# Patient Record
Sex: FEMALE | Race: WHITE | Employment: FULL TIME | URBAN - METROPOLITAN AREA
[De-identification: names, ages, dates, MRNs, and addresses within clinical notes are randomized per-mention and may not be internally consistent; named-entity substitution may affect disease eponyms.]

---

## 2017-01-23 ENCOUNTER — OFFICE VISIT (OUTPATIENT)
Dept: NEUROLOGY | Facility: CLINIC | Age: 37
End: 2017-01-23

## 2017-01-23 VITALS
WEIGHT: 155 LBS | HEIGHT: 64 IN | HEART RATE: 78 BPM | BODY MASS INDEX: 26.46 KG/M2 | SYSTOLIC BLOOD PRESSURE: 116 MMHG | RESPIRATION RATE: 16 BRPM | DIASTOLIC BLOOD PRESSURE: 70 MMHG

## 2017-01-23 DIAGNOSIS — G43.709 CHRONIC MIGRAINE WITHOUT AURA WITHOUT STATUS MIGRAINOSUS, NOT INTRACTABLE: Primary | ICD-10-CM

## 2017-01-23 PROCEDURE — 99213 OFFICE O/P EST LOW 20 MIN: CPT | Performed by: OTHER

## 2017-01-23 RX ORDER — SUMATRIPTAN 50 MG/1
50 TABLET, FILM COATED ORAL EVERY 2 HOUR PRN
Qty: 10 TABLET | Refills: 0 | Status: SHIPPED | OUTPATIENT
Start: 2017-01-23 | End: 2019-03-12

## 2017-01-23 RX ORDER — TOPIRAMATE 25 MG/1
25 TABLET ORAL 2 TIMES DAILY
Qty: 60 TABLET | Refills: 4 | Status: SHIPPED | OUTPATIENT
Start: 2017-01-23 | End: 2018-01-26 | Stop reason: ALTCHOICE

## 2017-01-23 NOTE — PROGRESS NOTES
HPI:    Patient ID: Bill Ingram is a 31year old female. HPI     Enedelia Nunez is here for follow up for migraine headaches. She was started on Topamax on last visit. She states headaches have improved since we added Topamax.  She is getting less frequent headache Prescriptions:  topiramate 25 MG Oral Tab Take 1 tablet (25 mg total) by mouth 2 (two) times daily.  Disp: 60 tablet Rfl: 4   SUMAtriptan Succinate 50 MG Oral Tab Take 1 tablet (50 mg total) by mouth every 2 (two) hours as needed for Migraine (Not to exceed and orientated x 3. Speech fluent. Pupils equally round and reactive to light. 3+ brisk bilaterally. EOMs intact. Visual fields are full. Face is symmetrical. Tongue is midline. Uvula and palate elevate symmetrically. Shrug shoulders normally bilaterally.

## 2017-01-23 NOTE — PATIENT INSTRUCTIONS
Refill policies:    • Allow 2 business days for refills; controlled substances may take longer.   • Contact your pharmacy at least 5 days prior to running out of medication and have them send an electronic request or submit request through the “request re your physician has recommended that you have a procedure or additional testing performed. [facility] will contact your insurance carrier to obtain pre-certification or prior authorization.     Unfortunately, LISSA has seen an increas

## 2017-02-01 ENCOUNTER — TELEPHONE (OUTPATIENT)
Dept: GASTROENTEROLOGY | Facility: CLINIC | Age: 37
End: 2017-02-01

## 2017-02-01 NOTE — TELEPHONE ENCOUNTER
Pt requesting to fax order lactulose hydrogen breath test to [facility] for scheduling. Pt states order was given in reg- script pad  But [facility] is requesting to fax order directly to them, [address]: [phone], Pt states she does not have the fax number.

## 2017-02-10 ENCOUNTER — HOSPITAL (OUTPATIENT)
Dept: OTHER | Age: 37
End: 2017-02-10
Attending: EMERGENCY MEDICINE

## 2017-04-20 ENCOUNTER — OFFICE VISIT (OUTPATIENT)
Dept: NEUROLOGY | Facility: CLINIC | Age: 37
End: 2017-04-20

## 2017-04-20 VITALS
DIASTOLIC BLOOD PRESSURE: 80 MMHG | SYSTOLIC BLOOD PRESSURE: 104 MMHG | TEMPERATURE: 100 F | RESPIRATION RATE: 16 BRPM | BODY MASS INDEX: 28 KG/M2 | WEIGHT: 166 LBS | OXYGEN SATURATION: 99 % | HEART RATE: 84 BPM

## 2017-04-20 DIAGNOSIS — M43.6 NECK STIFFNESS: ICD-10-CM

## 2017-04-20 DIAGNOSIS — G43.709 CHRONIC MIGRAINE WITHOUT AURA WITHOUT STATUS MIGRAINOSUS, NOT INTRACTABLE: Primary | ICD-10-CM

## 2017-04-20 PROCEDURE — 99213 OFFICE O/P EST LOW 20 MIN: CPT | Performed by: OTHER

## 2017-04-20 NOTE — PROGRESS NOTES
HPI:    Patient ID: Abdullahi Estes is a 31year old female. HPI       Edna Redd presents for follow up for migraine headaches. She states migraine headaches are stable however she has been having neck muscle tightness and at base of skull.  She has mild spine m daily. Disp: 60 tablet Rfl: 4   SUMAtriptan Succinate 50 MG Oral Tab Take 1 tablet (50 mg total) by mouth every 2 (two) hours as needed for Migraine (Not to exceed 2 pills daily).  Disp: 10 tablet Rfl: 0   TAZORAC 0.05 % External Gel GALEN AA QHS Disp:  Rfl: midline. Uvula and palate elevate symmetrically. Shrug shoulders normally bilaterally. The rest of the cranial nerves are grossly intact. Sensation to light touch is intact bilaterally. Motor: Normal tone bilaterally. No arm or leg weakness noted.  5/5 bila

## 2017-10-19 ENCOUNTER — NURSE TRIAGE (OUTPATIENT)
Dept: OTHER | Age: 37
End: 2017-10-19

## 2017-10-19 ENCOUNTER — APPOINTMENT (OUTPATIENT)
Dept: CT IMAGING | Facility: HOSPITAL | Age: 37
End: 2017-10-19
Attending: NURSE PRACTITIONER
Payer: COMMERCIAL

## 2017-10-19 ENCOUNTER — HOSPITAL ENCOUNTER (EMERGENCY)
Facility: HOSPITAL | Age: 37
Discharge: HOME | End: 2017-10-19
Payer: COMMERCIAL

## 2017-10-19 VITALS
BODY MASS INDEX: 29.27 KG/M2 | WEIGHT: 155 LBS | TEMPERATURE: 99 F | RESPIRATION RATE: 16 BRPM | HEIGHT: 61 IN | DIASTOLIC BLOOD PRESSURE: 82 MMHG | HEART RATE: 90 BPM | OXYGEN SATURATION: 100 % | SYSTOLIC BLOOD PRESSURE: 125 MMHG

## 2017-10-19 DIAGNOSIS — R10.31 ABDOMINAL PAIN, RLQ: Primary | ICD-10-CM

## 2017-10-19 DIAGNOSIS — N83.201 CYST OF RIGHT OVARY: ICD-10-CM

## 2017-10-19 PROCEDURE — 96374 THER/PROPH/DIAG INJ IV PUSH: CPT

## 2017-10-19 PROCEDURE — 85025 COMPLETE CBC W/AUTO DIFF WBC: CPT | Performed by: NURSE PRACTITIONER

## 2017-10-19 PROCEDURE — 81025 URINE PREGNANCY TEST: CPT

## 2017-10-19 PROCEDURE — 80048 BASIC METABOLIC PNL TOTAL CA: CPT | Performed by: NURSE PRACTITIONER

## 2017-10-19 PROCEDURE — 96361 HYDRATE IV INFUSION ADD-ON: CPT

## 2017-10-19 PROCEDURE — 80076 HEPATIC FUNCTION PANEL: CPT | Performed by: NURSE PRACTITIONER

## 2017-10-19 PROCEDURE — 81003 URINALYSIS AUTO W/O SCOPE: CPT | Performed by: NURSE PRACTITIONER

## 2017-10-19 PROCEDURE — 74177 CT ABD & PELVIS W/CONTRAST: CPT | Performed by: NURSE PRACTITIONER

## 2017-10-19 PROCEDURE — 99284 EMERGENCY DEPT VISIT MOD MDM: CPT

## 2017-10-19 RX ORDER — KETOROLAC TROMETHAMINE 30 MG/ML
30 INJECTION, SOLUTION INTRAMUSCULAR; INTRAVENOUS ONCE
Status: COMPLETED | OUTPATIENT
Start: 2017-10-19 | End: 2017-10-19

## 2017-10-19 RX ORDER — SODIUM CHLORIDE 9 MG/ML
125 INJECTION, SOLUTION INTRAVENOUS CONTINUOUS
Status: DISCONTINUED | OUTPATIENT
Start: 2017-10-19 | End: 2017-10-19

## 2017-10-19 NOTE — ED PROVIDER NOTES
Patient Seen in: [facility] AND [facility] Emergency Department    History   Patient presents with:  Abdomen/Flank Pain (GI/)    Stated Complaint: RLQ ABD Pain     Patient presents into the emergency room for evaluation of right lower quadrant abdominal pain. Hypertension Mother    • Depression Mother    • Other Zollie Ports Mother      COPD   • Lipids Maternal Grandmother      Hyperlipidemia   • Lipids Maternal Grandfather      Hyperlipidemia   • Lipids Paternal Grandmother      Hyperlipidemia   • Diabetes Paternal on palpation in the right lower quadrant of the abdomen in the pelvis. No rebound guarding or peritoneal signs. No tenderness at McBurney's point. No boardlike rigidity. No flank tenderness   Musculoskeletal: Normal range of motion.    Lymphadenopathy: Negative Negative   -BASIC METABOLIC PANEL (8)   Collection Time: 10/19/17 11:18 AM   Result Value Ref Range   Glucose 96 70 - 99 mg/dL   Sodium 136 136 - 144 mmol/L   Potassium 3.6 3.3 - 5.1 mmol/L   Chloride 107 95 - 110 mmol/L   CO2 22 22 - 32 mmol/L 10/19/17 11:18 AM   Result Value Ref Range   Hold Gold Auto Resulted    -RAINBOW DRAW LAVENDER TALL (BNP)   Collection Time: 10/19/17 11:18 AM   Result Value Ref Range   Hold Lavender Auto Resulted    -CBC W/ DIFFERENTIAL   Collection Time: 10/19/17 11:18

## 2017-10-19 NOTE — TELEPHONE ENCOUNTER
Action Requested: Summary for Provider     []  Critical Lab, Recommendations Needed  [] Need Additional Advice  [x]   FYI    []   Need Orders  [] Need Medications Sent to Pharmacy  []  Other     SUMMARY:  Pt to go to ER for evaluation.      Pt complaining o

## 2017-10-19 NOTE — ED INITIAL ASSESSMENT (HPI)
Sharp RLQ pain x 2 days, describes as \"tearing sensation\". Pt with history of hernia repair to that area 10 years ago. Denies N/V, +diarrhea.

## 2017-10-19 NOTE — ED NOTES
Pt presents with right quadrant abdominal pain x2 days. Pt states pain has progressively worsened over the days. Denies N/V. +diarrhea yesterday. No aggravating factors noted. Recently had IUD removed 3 weeks ago.

## 2017-10-20 NOTE — TELEPHONE ENCOUNTER
[facility]    Please transfer [id number] anytime. Thank you. ER disposition note from 10/19/17 as below;   Disposition and Plan      Clinical Impression:  Abdominal pain, RLQ  (primary encounter diagnosis)  Cyst of right ovary     Disposition:  There is no dispos

## 2017-10-24 NOTE — TELEPHONE ENCOUNTER
LM that we won't continue calling her. We will assume her issue has resolved sufficiently that she does not want or need f/u at this time. Advised to call should her pain recur.

## 2017-12-20 ENCOUNTER — HOSPITAL (OUTPATIENT)
Dept: OTHER | Age: 37
End: 2017-12-20

## 2017-12-20 LAB
APTT PPP: 28 SECONDS (ref 22–30)
APTT PPP: NORMAL S
INR PPP: 1
PROTHROMBIN TIME: 10.8 SECONDS (ref 9.7–11.8)
PROTHROMBIN TIME: NORMAL

## 2018-01-26 ENCOUNTER — OFFICE VISIT (OUTPATIENT)
Dept: INTERNAL MEDICINE CLINIC | Facility: CLINIC | Age: 38
End: 2018-01-26

## 2018-01-26 VITALS
WEIGHT: 165.81 LBS | DIASTOLIC BLOOD PRESSURE: 74 MMHG | SYSTOLIC BLOOD PRESSURE: 112 MMHG | TEMPERATURE: 99 F | HEIGHT: 61.5 IN | BODY MASS INDEX: 30.9 KG/M2 | HEART RATE: 93 BPM | RESPIRATION RATE: 18 BRPM | OXYGEN SATURATION: 98 %

## 2018-01-26 DIAGNOSIS — R87.612 LOW GRADE SQUAMOUS INTRAEPITHELIAL LESION ON CYTOLOGIC SMEAR OF CERVIX (LGSIL): ICD-10-CM

## 2018-01-26 DIAGNOSIS — Z00.00 WELLNESS EXAMINATION: Primary | ICD-10-CM

## 2018-01-26 DIAGNOSIS — J32.0 CHRONIC MAXILLARY SINUSITIS: ICD-10-CM

## 2018-01-26 DIAGNOSIS — B37.9 YEAST INFECTION: ICD-10-CM

## 2018-01-26 PROBLEM — J32.4 CHRONIC PANSINUSITIS: Status: RESOLVED | Noted: 2018-01-26 | Resolved: 2018-01-26

## 2018-01-26 PROBLEM — G43.909 MIGRAINES: Status: ACTIVE | Noted: 2018-01-26

## 2018-01-26 PROBLEM — G43.909 MIGRAINES: Status: RESOLVED | Noted: 2018-01-26 | Resolved: 2018-01-26

## 2018-01-26 PROBLEM — J32.4 CHRONIC PANSINUSITIS: Status: ACTIVE | Noted: 2018-01-26

## 2018-01-26 PROBLEM — F41.9 ANXIETY: Status: ACTIVE | Noted: 2018-01-26

## 2018-01-26 PROBLEM — F41.9 ANXIETY: Status: RESOLVED | Noted: 2018-01-26 | Resolved: 2018-01-26

## 2018-01-26 PROCEDURE — 99385 PREV VISIT NEW AGE 18-39: CPT | Performed by: INTERNAL MEDICINE

## 2018-01-26 PROCEDURE — 88175 CYTOPATH C/V AUTO FLUID REDO: CPT | Performed by: INTERNAL MEDICINE

## 2018-01-26 RX ORDER — FLUCONAZOLE 150 MG/1
150 TABLET ORAL ONCE
Qty: 1 TABLET | Refills: 0 | Status: SHIPPED | OUTPATIENT
Start: 2018-01-26 | End: 2018-01-26

## 2018-01-26 RX ORDER — ERGOCALCIFEROL 1.25 MG/1
50000 CAPSULE ORAL WEEKLY
Qty: 4 CAPSULE | Refills: 0 | Status: SHIPPED | OUTPATIENT
Start: 2018-01-26 | End: 2018-01-26 | Stop reason: CLARIF

## 2018-01-26 RX ORDER — FLUCONAZOLE 150 MG/1
150 TABLET ORAL ONCE
Qty: 1 TABLET | Refills: 2 | Status: SHIPPED | OUTPATIENT
Start: 2018-01-26 | End: 2018-01-29

## 2018-01-26 NOTE — PROGRESS NOTES
Pt's  verified  Per Dr. Leticia Elias Pt changed her mind about pap- sent thin prep specimen to [address] [place] [address] for testing

## 2018-01-26 NOTE — PROGRESS NOTES
HPI:    Patient ID: Zettie Meckel is a 30year old female. Pt here for a yearly check up and getting established. Has hx of chronic sinus surgery. Is currently on doxycycline rx for sinusitis.   Is on psychiatric care for bipolar deprerssion through  2 (two) hours as needed for Migraine (Not to exceed 2 pills daily). Disp: 10 tablet Rfl: 0   Fexofenadine HCl (ALLEGRA) 180 MG Oral Tab Take 180 mg by mouth daily.  Disp:  Rfl:      Allergies:  Penicillins                 Comment:Other reaction(s): Rash  [place]

## 2018-01-29 RX ORDER — FLUCONAZOLE 150 MG/1
150 TABLET ORAL ONCE
Qty: 1 TABLET | Refills: 2 | Status: SHIPPED | OUTPATIENT
Start: 2018-01-29 | End: 2018-01-29

## 2018-03-09 ENCOUNTER — OFFICE VISIT (OUTPATIENT)
Dept: OBGYN CLINIC | Facility: CLINIC | Age: 38
End: 2018-03-09

## 2018-03-09 VITALS
DIASTOLIC BLOOD PRESSURE: 78 MMHG | HEART RATE: 74 BPM | WEIGHT: 144 LBS | SYSTOLIC BLOOD PRESSURE: 118 MMHG | BODY MASS INDEX: 27 KG/M2

## 2018-03-09 DIAGNOSIS — R87.612 LOW GRADE SQUAMOUS INTRAEPITHELIAL LESION (LGSIL) AT RISK FOR HIGH GRADE SQUAMOUS INTRAEPITHELIAL LESION (HGSIL) ON CYTOLOGIC SMEAR OF CERVIX: Primary | ICD-10-CM

## 2018-03-09 PROCEDURE — 99201 OFFICE/OUTPT VISIT,NEW,LEVL I: CPT | Performed by: OBSTETRICS & GYNECOLOGY

## 2018-03-09 NOTE — PROGRESS NOTES
[facility], [facility]  Obstetrics and Gynecology  Focused Gynecology Problem Exam  Elie Heredia MD    Claudy Fried is a 30year old female presenting for Consult (pt is here to discuss Abnormal Pap smear results, Pt was referred by Dr. Maricarmen Webber)  .     HPI:   Pa hypertension          Menarche: 14y/o  Period Cycle (Days): Irregular         Use of Birth Control (if yes, specify type): Nexplanon     Hx Prior Abnormal Pap: Yes  Pap Result Notes: ASCUS in 2013 with NL follow up pap x 2           Past Medical History: 26.77 kg/m²     GENERAL: well developed, well nourished, in no apparent distress    ASSESSMENT:    A: 30 y.o. [place] Sitter with LGSIL cannot rule out HGSIL on Pap    (R87.612) Low grade squamous intraepithelial lesion (LGSIL) at risk for high grade squamous int

## 2018-03-28 ENCOUNTER — TELEPHONE (OUTPATIENT)
Dept: OBGYN CLINIC | Facility: CLINIC | Age: 38
End: 2018-03-28

## 2018-04-27 ENCOUNTER — OFFICE VISIT (OUTPATIENT)
Dept: OBGYN CLINIC | Facility: CLINIC | Age: 38
End: 2018-04-27

## 2018-04-27 VITALS
BODY MASS INDEX: 31 KG/M2 | WEIGHT: 168.81 LBS | DIASTOLIC BLOOD PRESSURE: 72 MMHG | SYSTOLIC BLOOD PRESSURE: 119 MMHG | HEART RATE: 85 BPM

## 2018-04-27 DIAGNOSIS — N89.8 VAGINAL DISCHARGE: ICD-10-CM

## 2018-04-27 DIAGNOSIS — R87.612 LOW GRADE SQUAMOUS INTRAEPITHELIAL LESION (LGSIL) AT RISK FOR HIGH GRADE SQUAMOUS INTRAEPITHELIAL LESION (HGSIL) ON CYTOLOGIC SMEAR OF CERVIX: Primary | ICD-10-CM

## 2018-04-27 DIAGNOSIS — N76.3 CHRONIC VULVITIS: ICD-10-CM

## 2018-04-27 DIAGNOSIS — Z01.812 PRE-PROCEDURAL LABORATORY EXAMINATION: ICD-10-CM

## 2018-04-27 DIAGNOSIS — R87.612 PAPANICOLAOU SMEAR OF CERVIX WITH LOW GRADE SQUAMOUS INTRAEPITHELIAL LESION (LGSIL): ICD-10-CM

## 2018-04-27 PROCEDURE — 57454 BX/CURETT OF CERVIX W/SCOPE: CPT | Performed by: OBSTETRICS & GYNECOLOGY

## 2018-04-27 PROCEDURE — 99213 OFFICE O/P EST LOW 20 MIN: CPT | Performed by: OBSTETRICS & GYNECOLOGY

## 2018-04-27 PROCEDURE — 81025 URINE PREGNANCY TEST: CPT | Performed by: OBSTETRICS & GYNECOLOGY

## 2018-04-27 RX ORDER — ADALIMUMAB 40MG/0.8ML
KIT SUBCUTANEOUS
COMMUNITY
Start: 2018-04-09 | End: 2019-03-12

## 2018-04-27 RX ORDER — VENLAFAXINE HYDROCHLORIDE 75 MG/1
CAPSULE, EXTENDED RELEASE ORAL
COMMUNITY
Start: 2018-03-27 | End: 2018-04-27

## 2018-04-27 NOTE — PROGRESS NOTES
[facility], [facility]  Obstetrics and Gynecology  Focused Gynecology Problem Exam  Nick Fitzgerald MD    Nayely Rosas is a 30year old female presenting for Procedure (Colposcopy Bx, UCG neg)  .     HPI:   Patient presents with:  Procedure: Colposcopy Bx, UCG neg Past Medical History:   Diagnosis Date   • Acute pelvic pain, female 2008    Laparoscopy -diagnostic (2008)   • Allergic rhinitis    • Anxiety state, unspecified    • Asthma    • Depression    • Extrinsic asthma, unspecified    • GERD (gastroeso normal pink mucosa, no lesions, normal clear discharge.  KOH negative for yeast.                     Cervix: Normal appearing   ASSESSMENT:    A: 30 y.o.  with LGSIL cannot r/o HGSIL  Chronic vulvovaginits    (C68.213) Low grade squamous intraepithel

## 2018-04-30 ENCOUNTER — TELEPHONE (OUTPATIENT)
Dept: OBGYN CLINIC | Facility: CLINIC | Age: 38
End: 2018-04-30

## 2018-04-30 NOTE — TELEPHONE ENCOUNTER
----- Message from Arjun Goel MD sent at 4/30/2018  9:27 AM CDT -----  Result noted. Please notify patient that vaginosis panel was positive for BV.   I have sent in a prescription of Flagyl and have also notified her via the patient portal.

## 2018-04-30 NOTE — TELEPHONE ENCOUNTER
Patient informed and verbalized understanding. Would like to know results for colp that was done on 4/27.  Pls advise

## 2018-05-01 ENCOUNTER — TELEPHONE (OUTPATIENT)
Dept: OBGYN CLINIC | Facility: CLINIC | Age: 38
End: 2018-05-01

## 2018-05-01 NOTE — TELEPHONE ENCOUNTER
----- Message from Black Hays MD sent at 5/1/2018  5:04 PM CDT -----  Result noted. 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 Patient is to have a follow-up Pap with HPV in 1 year.

## 2018-05-04 NOTE — TELEPHONE ENCOUNTER
Please try her again today and if no answer/response send her a certified letter. We did get in touch with her recently about her vaginosis panel. I also notified her of her dysplasia via [employer].

## 2018-05-26 ENCOUNTER — LAB ENCOUNTER (OUTPATIENT)
Dept: LAB | Facility: HOSPITAL | Age: 38
End: 2018-05-26
Attending: INTERNAL MEDICINE
Payer: COMMERCIAL

## 2018-05-26 DIAGNOSIS — Z00.00 WELLNESS EXAMINATION: ICD-10-CM

## 2018-05-26 PROCEDURE — 80053 COMPREHEN METABOLIC PANEL: CPT

## 2018-05-26 PROCEDURE — 82306 VITAMIN D 25 HYDROXY: CPT

## 2018-05-26 PROCEDURE — 80061 LIPID PANEL: CPT

## 2018-05-26 PROCEDURE — 84443 ASSAY THYROID STIM HORMONE: CPT

## 2018-05-26 PROCEDURE — 36415 COLL VENOUS BLD VENIPUNCTURE: CPT

## 2018-05-29 ENCOUNTER — APPOINTMENT (OUTPATIENT)
Dept: GENERAL RADIOLOGY | Facility: HOSPITAL | Age: 38
End: 2018-05-29
Attending: NURSE PRACTITIONER
Payer: COMMERCIAL

## 2018-05-29 ENCOUNTER — HOSPITAL ENCOUNTER (OUTPATIENT)
Age: 38
Discharge: HOME | End: 2018-05-29
Payer: COMMERCIAL

## 2018-05-29 ENCOUNTER — HOSPITAL ENCOUNTER (EMERGENCY)
Facility: HOSPITAL | Age: 38
Discharge: HOME | End: 2018-05-29
Payer: COMMERCIAL

## 2018-05-29 VITALS
DIASTOLIC BLOOD PRESSURE: 66 MMHG | RESPIRATION RATE: 18 BRPM | OXYGEN SATURATION: 100 % | SYSTOLIC BLOOD PRESSURE: 103 MMHG | TEMPERATURE: 98 F | HEART RATE: 84 BPM

## 2018-05-29 VITALS
HEIGHT: 61 IN | SYSTOLIC BLOOD PRESSURE: 107 MMHG | HEART RATE: 88 BPM | OXYGEN SATURATION: 99 % | TEMPERATURE: 97 F | DIASTOLIC BLOOD PRESSURE: 75 MMHG | WEIGHT: 155 LBS | BODY MASS INDEX: 29.27 KG/M2 | RESPIRATION RATE: 17 BRPM

## 2018-05-29 DIAGNOSIS — R00.2 PALPITATIONS: Primary | ICD-10-CM

## 2018-05-29 DIAGNOSIS — R00.2 HEART PALPITATIONS: Primary | ICD-10-CM

## 2018-05-29 DIAGNOSIS — R42 LIGHTHEADEDNESS: ICD-10-CM

## 2018-05-29 PROCEDURE — 99204 OFFICE O/P NEW MOD 45 MIN: CPT

## 2018-05-29 PROCEDURE — 84484 ASSAY OF TROPONIN QUANT: CPT | Performed by: NURSE PRACTITIONER

## 2018-05-29 PROCEDURE — 93010 ELECTROCARDIOGRAM REPORT: CPT

## 2018-05-29 PROCEDURE — 85379 FIBRIN DEGRADATION QUANT: CPT | Performed by: NURSE PRACTITIONER

## 2018-05-29 PROCEDURE — 81025 URINE PREGNANCY TEST: CPT

## 2018-05-29 PROCEDURE — 99214 OFFICE O/P EST MOD 30 MIN: CPT

## 2018-05-29 PROCEDURE — 85025 COMPLETE CBC W/AUTO DIFF WBC: CPT | Performed by: NURSE PRACTITIONER

## 2018-05-29 PROCEDURE — 93010 ELECTROCARDIOGRAM REPORT: CPT | Performed by: EMERGENCY MEDICINE

## 2018-05-29 PROCEDURE — 99285 EMERGENCY DEPT VISIT HI MDM: CPT

## 2018-05-29 PROCEDURE — 93005 ELECTROCARDIOGRAM TRACING: CPT

## 2018-05-29 PROCEDURE — 71046 X-RAY EXAM CHEST 2 VIEWS: CPT | Performed by: NURSE PRACTITIONER

## 2018-05-29 PROCEDURE — 80048 BASIC METABOLIC PNL TOTAL CA: CPT | Performed by: NURSE PRACTITIONER

## 2018-05-29 PROCEDURE — 36415 COLL VENOUS BLD VENIPUNCTURE: CPT

## 2018-05-29 RX ORDER — ERGOCALCIFEROL 1.25 MG/1
50000 CAPSULE ORAL WEEKLY
Qty: 4 CAPSULE | Refills: 5 | Status: SHIPPED | OUTPATIENT
Start: 2018-05-29 | End: 2018-05-29 | Stop reason: CLARIF

## 2018-05-29 RX ORDER — POTASSIUM CHLORIDE 20 MEQ/1
40 TABLET, EXTENDED RELEASE ORAL ONCE
Status: COMPLETED | OUTPATIENT
Start: 2018-05-29 | End: 2018-05-29

## 2018-05-29 NOTE — ED NOTES
EKG completed at [facility]. Dr. Anguiano Ours given a copy of the EKG. No additional EKG needed at this time.

## 2018-05-29 NOTE — ED NOTES
Results reviewed w/ patient by MD. Pt is agreeable and comfortable with DC plan. AVS/DC instructions reviewed w/ patient and pt understands to FU with PCP as instructed by MD. Pt denies any further questions/concerns. IV removed. VSS.  Patient DC home in go

## 2018-05-29 NOTE — ED PROVIDER NOTES
Patient Seen in: [address]    History   Patient presents with:  Arrythmia/Palpitations (cardiovascular)    Stated Complaint: Heart Palpitations for 5 Days    HPI    Patient is a 70-year-old female with a history of hyper Negative. Respiratory: Negative. Cardiovascular: Positive for chest pain and palpitations. Gastrointestinal: Negative. Neurological: Positive for light-headedness.        Positive for stated complaint: Heart Palpitations for 5 Days  Other systems Declined ambulance. Stable prior to discharge. Disposition and Plan     Clinical Impression:  Heart palpitations  (primary encounter diagnosis)  Lightheadedness    Disposition:   Ic to ed  5/29/2018  1:48 pm

## 2018-05-29 NOTE — PROGRESS NOTES
Called Pt n/a jennifer per ED to L/M on VM- Pt notified normal rslts for lab except Vit D per Dr Oconnell Failing will send High dose Rx to [facility] on file

## 2018-05-29 NOTE — ED NOTES
Pt to ED for evaluation of intermittent palpitations with + lightheadedness without any CALIN/SOB worse over the past 5 days.  Per patient she has been feeling this for months but noticed increased frequency and intensity over the past few days- mostly at leobardo

## 2018-05-29 NOTE — ED INITIAL ASSESSMENT (HPI)
C/O \"HEART PALPATIONS\" FOR \"A COUPLE OF WEEKS. \"  STATES OVER THE LAST 5 DAYS THEY HAVE BECOME MORE FREQUENT WHERE SHE HAS THEM \"A COUPLE TIMES A DAY. \"  DENIES HX OF HEART ARRHYTHMIAS. REPORTS HISTORY.   STATES SHE FEELS FAINT WHEN SHE HAS THESE EPISO

## 2018-05-29 NOTE — ED NOTES
Patient to ED 15- ECG completed in triage and reviewed- NSR. Patient changing into gown and providing urine sample at this time.

## 2018-08-06 ENCOUNTER — TELEPHONE (OUTPATIENT)
Dept: INTERNAL MEDICINE CLINIC | Facility: CLINIC | Age: 38
End: 2018-08-06

## 2018-08-06 NOTE — TELEPHONE ENCOUNTER
Patient is calling c/o nausea, headaches started last week. Yesterday she started with diarrhea. She want to be seen by Dr. Teri Nicole only tomorrow or is she can recommend her on what to do.

## 2018-08-19 NOTE — PROGRESS NOTES
[facility] Follow-up Visit    Sara great deal of difficulty scheduling this and try for over a month to schedule this with Marci unsuccessfully.   She was placed on antibiotics for a different reason around that same time and had complete symptom resolution and therefore did not follow-up o CAD   • Lipids Mother      Hyperlipidemia   • Hypertension Mother    • Depression Mother    • Other Lisa Sites Mother      COPD   • Lipids Maternal Grandmother      Hyperlipidemia   • Lipids Maternal Grandfather      Hyperlipidemia   • Lipids Paternal Grandmo rash  ALLERGIC/IMMUNOLOGIC:  negative for hay fever allergies  ENDOCRINE:  negative for cold intolerance and heat intolerance  MUSCULOSKELETAL:  negative for  joint stiffness and joint swelling  BEHAVIOR/PSYCH:  negative for depressed mood    PHYSICAL EXAM increasing Protonix twice daily, and follow-up in 6-8 weeks for a symptom update versus endoscopic evaluation including the risks, benefits, limitations of the procedure.   The patient would prefer the latter though she would be amenable to increasing Ellie agreed to sign an informed consent and elected to proceed with upper endoscopy/enteroscopy with possible intervention [i.e. polypectomy, ablation, stent placement, etc.] as indicated.         Orders This Visit:  No orders of the defined types were placed in

## 2018-08-29 ENCOUNTER — OFFICE VISIT (OUTPATIENT)
Dept: GASTROENTEROLOGY | Facility: CLINIC | Age: 38
End: 2018-08-29
Payer: COMMERCIAL

## 2018-08-29 ENCOUNTER — TELEPHONE (OUTPATIENT)
Dept: GASTROENTEROLOGY | Facility: CLINIC | Age: 38
End: 2018-08-29

## 2018-08-29 VITALS
DIASTOLIC BLOOD PRESSURE: 77 MMHG | HEART RATE: 99 BPM | HEIGHT: 61 IN | BODY MASS INDEX: 31.34 KG/M2 | WEIGHT: 166 LBS | SYSTOLIC BLOOD PRESSURE: 112 MMHG

## 2018-08-29 DIAGNOSIS — K21.9 GASTROESOPHAGEAL REFLUX DISEASE, ESOPHAGITIS PRESENCE NOT SPECIFIED: Primary | ICD-10-CM

## 2018-08-29 DIAGNOSIS — R10.13 EPIGASTRIC PAIN: ICD-10-CM

## 2018-08-29 PROCEDURE — 99212 OFFICE O/P EST SF 10 MIN: CPT | Performed by: NURSE PRACTITIONER

## 2018-08-29 PROCEDURE — 99214 OFFICE O/P EST MOD 30 MIN: CPT | Performed by: NURSE PRACTITIONER

## 2018-08-29 RX ORDER — ERGOCALCIFEROL 1.25 MG/1
CAPSULE ORAL
COMMUNITY
Start: 2018-08-12 | End: 2018-11-28

## 2018-08-29 RX ORDER — PANTOPRAZOLE SODIUM 40 MG/1
40 TABLET, DELAYED RELEASE ORAL
Qty: 60 TABLET | Refills: 5 | Status: SHIPPED | OUTPATIENT
Start: 2018-08-29 | End: 2019-04-01

## 2018-08-29 NOTE — PATIENT INSTRUCTIONS
1. Schedule upper endoscopy (EGD) w/ possible biopsy with Dr. Padmini Ramirez w/ IV Twilight or MAC  2. Increase Protonix to twice a day   3.  Follow up with new or worsening symptoms       Avoid Heartburn Triggers  - Laying down after meals (sit upright for a

## 2018-08-29 NOTE — TELEPHONE ENCOUNTER
Scheduled for: EGD    Provider Name:   Date:  10-5-18  Location:  [facility]  Sedation:  IV sedation   Time:  11:30am, pt aware CF will call with arrival   Prep: NPO after midnight   Meds/Allergies Reconciled?:  yes  Diagnosis with codes:  GERD K21.0  Was pat

## 2018-10-05 ENCOUNTER — LAB REQUISITION (OUTPATIENT)
Dept: LAB | Facility: HOSPITAL | Age: 38
End: 2018-10-05
Payer: COMMERCIAL

## 2018-10-05 DIAGNOSIS — Z01.89 ENCOUNTER FOR OTHER SPECIFIED SPECIAL EXAMINATIONS: ICD-10-CM

## 2018-10-05 PROCEDURE — 88305 TISSUE EXAM BY PATHOLOGIST: CPT | Performed by: INTERNAL MEDICINE

## 2018-10-05 PROCEDURE — 88312 SPECIAL STAINS GROUP 1: CPT | Performed by: INTERNAL MEDICINE

## 2018-11-27 RX ORDER — ERGOCALCIFEROL 1.25 MG/1
CAPSULE ORAL
Refills: 0 | Status: CANCELLED | OUTPATIENT
Start: 2018-11-27

## 2018-11-28 ENCOUNTER — TELEPHONE (OUTPATIENT)
Dept: GASTROENTEROLOGY | Facility: CLINIC | Age: 38
End: 2018-11-28

## 2018-11-28 NOTE — TELEPHONE ENCOUNTER
Nahum SAGASTUME--you saw in office 8/29/18 and Dr Danny Chino did egd 10/5. Would you be able to refill? Thanks.

## 2018-11-28 NOTE — TELEPHONE ENCOUNTER
Nursing: Can we clarify the Protonix dosing the patient is currently on. When I saw the patient in office we discussed increasing it to twice daily up until the time of the procedure. Is she is taking medication twice daily versus daily?

## 2018-11-28 NOTE — TELEPHONE ENCOUNTER
Requested Prescriptions     Pending Prescriptions Disp Refills   • ergocalciferol 52631 units Oral Cap  0     Last office visit: 1-26-18  Medication last refilled: 8-12-18

## 2019-03-05 ENCOUNTER — TELEPHONE (OUTPATIENT)
Dept: OBGYN CLINIC | Facility: CLINIC | Age: 39
End: 2019-03-05

## 2019-03-05 ENCOUNTER — OFFICE VISIT (OUTPATIENT)
Dept: OBGYN CLINIC | Facility: CLINIC | Age: 39
End: 2019-03-05

## 2019-03-05 DIAGNOSIS — Z71.1 PERSON WITH FEARED COMPLAINT, NO DIAGNOSIS MADE: Primary | ICD-10-CM

## 2019-03-05 NOTE — TELEPHONE ENCOUNTER
3/5/2019 pt is due for her f/u pap in 1 yr due to JESE 1 mild dysplasia. Pt has not scheduled any appt. Recall letter was sent today.               VÍCTOR Gaytan

## 2019-03-12 ENCOUNTER — OFFICE VISIT (OUTPATIENT)
Dept: INTERNAL MEDICINE CLINIC | Facility: CLINIC | Age: 39
End: 2019-03-12
Payer: COMMERCIAL

## 2019-03-12 VITALS
HEIGHT: 61 IN | WEIGHT: 154 LBS | SYSTOLIC BLOOD PRESSURE: 114 MMHG | DIASTOLIC BLOOD PRESSURE: 78 MMHG | BODY MASS INDEX: 29.07 KG/M2

## 2019-03-12 DIAGNOSIS — J32.0 CHRONIC MAXILLARY SINUSITIS: ICD-10-CM

## 2019-03-12 DIAGNOSIS — Z00.00 WELLNESS EXAMINATION: Primary | ICD-10-CM

## 2019-03-12 DIAGNOSIS — F32.1 CURRENT MODERATE EPISODE OF MAJOR DEPRESSIVE DISORDER, UNSPECIFIED WHETHER RECURRENT (HCC): ICD-10-CM

## 2019-03-12 PROCEDURE — 99395 PREV VISIT EST AGE 18-39: CPT | Performed by: INTERNAL MEDICINE

## 2019-03-12 RX ORDER — TRAZODONE HYDROCHLORIDE 50 MG/1
50 TABLET ORAL
COMMUNITY
Start: 2019-01-03 | End: 2019-06-14

## 2019-03-12 RX ORDER — SUMATRIPTAN 50 MG/1
50 TABLET, FILM COATED ORAL EVERY 2 HOUR PRN
Qty: 10 TABLET | Refills: 3 | Status: SHIPPED | OUTPATIENT
Start: 2019-03-12 | End: 2020-07-29

## 2019-03-12 RX ORDER — AZITHROMYCIN 250 MG/1
TABLET, FILM COATED ORAL
Qty: 6 TABLET | Refills: 0 | Status: SHIPPED | OUTPATIENT
Start: 2019-03-12 | End: 2020-03-06

## 2019-03-12 NOTE — PROGRESS NOTES
HPI:    Patient ID: Antonio Walsh is a 41year old female. Pt here for a general check up and fu on a recent URI treated with steroid pack and inhaler. This was prescribed by ENT.  Depression is in full force - had recent death of fiance - seeing counselo person, place, and time. She appears well-developed and well-nourished. HENT:   Head: Normocephalic. Right Ear: No cerumen present  Left Ear: No cerumen present  Post nasdal drip   Eyes: Pupils are equal, round, and reactive to light.  No scleral icteru

## 2019-04-01 RX ORDER — PANTOPRAZOLE SODIUM 40 MG/1
TABLET, DELAYED RELEASE ORAL
Qty: 60 TABLET | Refills: 5 | Status: SHIPPED | OUTPATIENT
Start: 2019-04-01 | End: 2019-10-22

## 2019-04-01 NOTE — TELEPHONE ENCOUNTER
LOV 8-29-18. Refill request for Pantoprazole 40 MG- 1 tablet by mouth two times a day before meals -#60. Please advise, thanks.

## 2019-04-12 ENCOUNTER — LAB ENCOUNTER (OUTPATIENT)
Dept: LAB | Facility: HOSPITAL | Age: 39
End: 2019-04-12
Attending: INTERNAL MEDICINE
Payer: COMMERCIAL

## 2019-04-12 DIAGNOSIS — Z00.00 WELLNESS EXAMINATION: ICD-10-CM

## 2019-04-12 PROCEDURE — 84443 ASSAY THYROID STIM HORMONE: CPT

## 2019-04-12 PROCEDURE — 80053 COMPREHEN METABOLIC PANEL: CPT

## 2019-04-12 PROCEDURE — 82306 VITAMIN D 25 HYDROXY: CPT

## 2019-04-12 PROCEDURE — 85025 COMPLETE CBC W/AUTO DIFF WBC: CPT

## 2019-04-12 PROCEDURE — 80061 LIPID PANEL: CPT

## 2019-04-12 PROCEDURE — 36415 COLL VENOUS BLD VENIPUNCTURE: CPT

## 2019-05-14 ENCOUNTER — OFFICE VISIT (OUTPATIENT)
Dept: RHEUMATOLOGY | Facility: CLINIC | Age: 39
End: 2019-05-14
Payer: COMMERCIAL

## 2019-05-14 ENCOUNTER — APPOINTMENT (OUTPATIENT)
Dept: LAB | Facility: HOSPITAL | Age: 39
End: 2019-05-14
Attending: INTERNAL MEDICINE
Payer: COMMERCIAL

## 2019-05-14 VITALS
HEIGHT: 61 IN | BODY MASS INDEX: 30.21 KG/M2 | SYSTOLIC BLOOD PRESSURE: 110 MMHG | DIASTOLIC BLOOD PRESSURE: 70 MMHG | HEART RATE: 82 BPM | WEIGHT: 160 LBS

## 2019-05-14 DIAGNOSIS — M45.8 ANKYLOSING SPONDYLITIS OF SACRAL REGION (HCC): ICD-10-CM

## 2019-05-14 DIAGNOSIS — M35.00 SICCA, UNSPECIFIED TYPE (HCC): ICD-10-CM

## 2019-05-14 DIAGNOSIS — M45.8 ANKYLOSING SPONDYLITIS OF SACRAL REGION (HCC): Primary | ICD-10-CM

## 2019-05-14 PROCEDURE — 99212 OFFICE O/P EST SF 10 MIN: CPT | Performed by: INTERNAL MEDICINE

## 2019-05-14 PROCEDURE — 99244 OFF/OP CNSLTJ NEW/EST MOD 40: CPT | Performed by: INTERNAL MEDICINE

## 2019-05-14 PROCEDURE — 36415 COLL VENOUS BLD VENIPUNCTURE: CPT

## 2019-05-14 PROCEDURE — 86235 NUCLEAR ANTIGEN ANTIBODY: CPT

## 2019-05-14 PROCEDURE — 86140 C-REACTIVE PROTEIN: CPT

## 2019-05-14 PROCEDURE — 85652 RBC SED RATE AUTOMATED: CPT

## 2019-05-14 RX ORDER — CYCLOBENZAPRINE HCL 5 MG
5 TABLET ORAL NIGHTLY
Qty: 30 TABLET | Refills: 1 | Status: SHIPPED | OUTPATIENT
Start: 2019-05-14 | End: 2019-06-14

## 2019-05-14 NOTE — PATIENT INSTRUCTIONS
Summary:  1. Check labs   2. Stop the tizandine   3. Try flexeril 5mg at night ( cyclobenzaprine)   4. Cont. consentyx   5. Return to clinic in 3-4 weeks. 6. Try marybeth chi or yoga   7.  Finish PT for left shoulder

## 2019-05-14 NOTE — PROGRESS NOTES
Francesca Moya is a 41year old female who presents for Patient presents with:  Consult: Pt currently on consentyx. Would like other medication recommendations  Neck Pain  Shoulder Pain  Joint Pain: When a flare happens, pt has difficulty walking  .    HPI: She has morning stiffness of 20-30min. It's constant. She has more pain on her left side. She tried physical therapy before treatment and that flared her back. Her feet and ankles and shoulders are hurting. She was told it might be tendonitis.    She's ClonazePAM (KLONOPIN) 0.5 MG Oral Tab 0.25 mg 3 (three) times daily as needed (sleep, anxiety). Disp:  Rfl: 2   lamoTRIgine (LAMICTAL) 200 MG Oral Tab Take 250 mg by mouth daily.    Disp:  Rfl: 2   azithromycin (ZITHROMAX Z-CORBIN) 250 MG Oral Tab Take two t Single mom of 13year old. 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       REVIEW OF SYSTEMS:   Review Of Systems:  Fatigue  Constitutional:No fever, no change in weight or appetitie  Derm: No rashe CVS: RRR, no murmurs  RS: CTAB, no crackles, no rhonchi  ABD: Soft Non tender, no HSM felt, BS positive  Joint exam:   Tender in paracervical neck area adolfo on left side   Left shoulder tender with abduction - but intact full rom   Tender in b/l elbows   No 10.0 - 20.0 18.6   CALCIUM      8.5 - 10.1 mg/dL 9.2   CALCULATED OSMOLALITY      275 - 295 mOsm/kg 296 (H)   GFR, Non-      >=60 112   GFR, -American      >=60 129   ALT (SGPT)      13 - 56 U/L 27   AST (SGOT)      15 - 37 U/L 1 IMPRESSION:  Small right subarticular disc protrusion at C5-6 with mild narrowing of the right neural foramina. Otherwise, no spinal canal or foraminal narrowing elsewhere in the cervical spine.     ASSESSMENT AND PLAN:   Ryley Ruiz is a 41year old femal

## 2019-06-14 ENCOUNTER — TELEPHONE (OUTPATIENT)
Dept: RHEUMATOLOGY | Facility: CLINIC | Age: 39
End: 2019-06-14

## 2019-06-14 ENCOUNTER — OFFICE VISIT (OUTPATIENT)
Dept: RHEUMATOLOGY | Facility: CLINIC | Age: 39
End: 2019-06-14
Payer: COMMERCIAL

## 2019-06-14 VITALS
DIASTOLIC BLOOD PRESSURE: 79 MMHG | SYSTOLIC BLOOD PRESSURE: 120 MMHG | BODY MASS INDEX: 30.4 KG/M2 | WEIGHT: 161 LBS | HEIGHT: 61 IN | HEART RATE: 94 BPM

## 2019-06-14 DIAGNOSIS — Z51.81 THERAPEUTIC DRUG MONITORING: ICD-10-CM

## 2019-06-14 DIAGNOSIS — M45.8 ANKYLOSING SPONDYLITIS OF SACRAL REGION (HCC): Primary | ICD-10-CM

## 2019-06-14 PROCEDURE — 99212 OFFICE O/P EST SF 10 MIN: CPT | Performed by: INTERNAL MEDICINE

## 2019-06-14 PROCEDURE — 99214 OFFICE O/P EST MOD 30 MIN: CPT | Performed by: INTERNAL MEDICINE

## 2019-06-14 RX ORDER — ESCITALOPRAM OXALATE 20 MG/1
20 TABLET ORAL DAILY
COMMUNITY
End: 2020-12-18 | Stop reason: ALTCHOICE

## 2019-06-14 RX ORDER — CYCLOBENZAPRINE HCL 5 MG
TABLET ORAL
Qty: 180 TABLET | Refills: 1 | Status: SHIPPED | OUTPATIENT
Start: 2019-06-14 | End: 2020-04-06

## 2019-06-14 RX ORDER — LIDOCAINE 50 MG/G
PATCH TOPICAL AS NEEDED
Refills: 2 | COMMUNITY
Start: 2019-04-16 | End: 2020-12-18 | Stop reason: ALTCHOICE

## 2019-06-14 RX ORDER — PANTOPRAZOLE SODIUM 20 MG/1
20 TABLET, DELAYED RELEASE ORAL 2 TIMES DAILY
COMMUNITY
End: 2019-11-05 | Stop reason: DRUGHIGH

## 2019-06-14 NOTE — PROGRESS NOTES
Fabiola Subramanian is a 41year old female who presents for Patient presents with: Ankylosing Spondylitis  Neck Pain  . HPI:     I had the pleasure of seeing Fabiola Subramanian on 5/14/2019 for evaluation.      She is a pleasant 41year old who has a hx of ankylosin Her feet and ankles and shoulders are hurting. She was told it might be tendonitis. She's in PT for left shoulder right now for rotator cuff tendonitis. shes' taking aleve prn. It takes the edge off. No hx of psoriasis.  She gets eczema and dry skin lidocaine 5 % External Patch as needed. Disp:  Rfl: 2   escitalopram (LEXAPRO) 20 MG Oral Tab Take 20 mg by mouth daily. Disp:  Rfl:    Pantoprazole Sodium 20 MG Oral Tab EC Take 20 mg by mouth 2 (two) times daily.  Disp:  Rfl:    Cetirizine HCl (ZYRTEC OR) • GERD (gastroesophageal reflux disease)    • History of nephrolithiasis    • Hx of sinus surgery 2007   • Hyperlipidemia    • IBS (irritable bowel syndrome)    • Lipid screening 3/12/2011    per NG      Past Surgical History:   Procedure Laterality Date GI: younger she had IBS and she has GERd. She had EGD in 10/2018 - had chronic GERD, she had colonoscopy years ago that was normal,  No nausea, no vomiiting, no abominal pain,  no dyshpagia, no BRBPR or melena, gets diarrhea and bloating, on pantoprazole. EXTREMITIES: no cyanosis, clubbing or edema  NEURO: intact touch, 5/5 ue and le strength    Component      Latest Ref Rng & Units 4/12/2019   WBC      4.0 - 11.0 x10(3) uL 5.5   RBC      3.80 - 5.30 x10(6)uL 4.12   Hemoglobin      12.0 - 16.0 g/dL 12.1   H 40 - 59 mg/dL 45   Triglycerides      30 - 149 mg/dL 187 (H)   LDL Cholesterol Calc      <100 mg/dL 121 (H)   VLDL      0 - 30 mg/dL 37 (H)   NON HDL CHOL      <130 mg/dL 158 (H)   Patient Fasting?        Yes   TSH      0.358 - 3.740 mIU/mL 2.850   [place] 1. Ankylosing spondylitis - left sided sacroillitis   -   Cont.  cosenytx - started 8/2018 -   Recommend PT for si joint - can be gradual -  She will get  finishes her PT for left shoulder and get cortisone shot   - try left si joint PT now and pool therapy

## 2019-06-14 NOTE — PATIENT INSTRUCTIONS
1. Increase  flexeril 5-10 mg at night ( cyclobenzaprine) - can try 7.5mg (1 1/2 tablets also )   2. Cont. consentyx   3. Return to clinic in 6 months   4. . PT for lower back - left side - also consider  marybeth chi or yoga or heated pool stretching   5.  Fax

## 2019-06-14 NOTE — TELEPHONE ENCOUNTER
Pt. Will need renewal for cosentyx in august - but she will fax us the TB test now -   She states the approval took some time with her other rheumatologist   - if she sends us the fax of tb test - then we can start the reapproval under us earlier

## 2019-06-24 NOTE — TELEPHONE ENCOUNTER
Ham Andre [id number] or [id number] - does pt have updated QFT results (last results in media scan are from 2/22/2018)? Also, pt to provide pharmacy benefits information for PA (ID #, contact #).

## 2019-07-02 ENCOUNTER — TELEPHONE (OUTPATIENT)
Dept: OBGYN CLINIC | Facility: CLINIC | Age: 39
End: 2019-07-02

## 2019-07-02 NOTE — TELEPHONE ENCOUNTER
07/02/2019 Dr. Dumas , pt was due for her 1 year f/u pap, due to Colpo Bx showed Midl Dysplasia/ JESE 1. Recall letter was sent on 06/04/2019. Pt hasn't schedule any betina. May we send a certified letter.          Please Advs Dr. Dumas        Please send back to clini

## 2019-07-03 NOTE — TELEPHONE ENCOUNTER
Please try to contact patient via the phone and if no contact is made, please send patient a certified letter stating that she needs to return for follow up of her abnormal pap smear and cervical cancer screening.

## 2019-07-25 NOTE — TELEPHONE ENCOUNTER
Per [employer] representative, current PA on file valid through 9/7/2019.  New PA form will be faxed to [phone] for completion in August.    Medication PA Requested: Cosentyx 150mg pen- continuation of therapy               Pt insurance/number to

## 2019-08-07 NOTE — TELEPHONE ENCOUNTER
[employer]/Prior Authorization [id number] case number [id number] Per [facility] she would like to speak with the RN to get clinical criteria information answered. Per [facility] she will also be faxing the information today.

## 2019-08-11 ENCOUNTER — PATIENT MESSAGE (OUTPATIENT)
Dept: RHEUMATOLOGY | Facility: CLINIC | Age: 39
End: 2019-08-11

## 2019-08-12 ENCOUNTER — MED REC SCAN ONLY (OUTPATIENT)
Dept: RHEUMATOLOGY | Facility: CLINIC | Age: 39
End: 2019-08-12

## 2019-08-12 NOTE — TELEPHONE ENCOUNTER
From: Max Byrd  To: Itz Bolivar MD  Sent: 8/11/2019 11:20 AM CDT  Subject: Test Results Question    I have attached TB test results required for Cosentyx for your record. Thanks!

## 2019-10-22 ENCOUNTER — TELEPHONE (OUTPATIENT)
Dept: GASTROENTEROLOGY | Facility: CLINIC | Age: 39
End: 2019-10-22

## 2019-10-25 NOTE — TELEPHONE ENCOUNTER
Please advise on refill request below. Thank you.     LV 08/29/18 with [facility]    LR  04/01/19 #60 with 5 refills per [facility]    No future appointments noted in computer

## 2019-10-28 RX ORDER — PANTOPRAZOLE SODIUM 40 MG/1
TABLET, DELAYED RELEASE ORAL
Qty: 60 TABLET | Refills: 1 | Status: SHIPPED | OUTPATIENT
Start: 2019-10-28 | End: 2019-11-04

## 2019-10-28 NOTE — TELEPHONE ENCOUNTER
Nursing: Short-term Rx sent.   Patient is due for a nonurgent office follow-up as it has been >1-year since she was last seen

## 2019-10-29 RX ORDER — PANTOPRAZOLE SODIUM 40 MG/1
TABLET, DELAYED RELEASE ORAL
Qty: 60 TABLET | Refills: 0 | OUTPATIENT
Start: 2019-10-29

## 2019-10-29 NOTE — TELEPHONE ENCOUNTER
Pt returned call and I was able to schedule her F/U appt Wed 12/04/19. I reminded pt where to come for appt and to arrive 10-15 minutes earlier. Pt agreed and verbalized understanding.

## 2019-10-29 NOTE — TELEPHONE ENCOUNTER
Requested Prescriptions     Pending Prescriptions Disp Refills   • PANTOPRAZOLE SODIUM 40 MG Oral Tab EC [Pharmacy Med Name: PANTOPRAZOLE 40MG TABLETS] 60 tablet 0     Sig: TAKE 1 TABLET(40 MG) BY MOUTH TWICE DAILY BEFORE MEALS     DUPLICATE REFILL  SEE te

## 2019-10-31 NOTE — TELEPHONE ENCOUNTER
[address]- Sp[facility] to [facility]ars[facility] at [facility] was asked to be refilled but keeps getting denied by our office. Please resend they do not have RX script.

## 2019-11-04 RX ORDER — PANTOPRAZOLE SODIUM 40 MG/1
TABLET, DELAYED RELEASE ORAL
Qty: 60 TABLET | Refills: 0 | OUTPATIENT
Start: 2019-11-04

## 2019-11-04 RX ORDER — PANTOPRAZOLE SODIUM 40 MG/1
40 TABLET, DELAYED RELEASE ORAL
Qty: 60 TABLET | Refills: 1 | Status: SHIPPED | OUTPATIENT
Start: 2019-11-04 | End: 2019-12-04 | Stop reason: ALTCHOICE

## 2019-11-04 NOTE — TELEPHONE ENCOUNTER
Prescription for Protonix 40 mg twice daily #60  1 RF  was sent on 10/28/2019. It does not appear to have been received by pharmacy and I will resend.     [facility]

## 2019-11-05 ENCOUNTER — OFFICE VISIT (OUTPATIENT)
Dept: INTERNAL MEDICINE CLINIC | Facility: CLINIC | Age: 39
End: 2019-11-05
Payer: COMMERCIAL

## 2019-11-05 VITALS
DIASTOLIC BLOOD PRESSURE: 72 MMHG | OXYGEN SATURATION: 98 % | HEART RATE: 91 BPM | SYSTOLIC BLOOD PRESSURE: 112 MMHG | WEIGHT: 175 LBS | BODY MASS INDEX: 33.04 KG/M2 | HEIGHT: 61 IN

## 2019-11-05 DIAGNOSIS — J01.00 ACUTE MAXILLARY SINUSITIS, RECURRENCE NOT SPECIFIED: Primary | ICD-10-CM

## 2019-11-05 PROCEDURE — 99213 OFFICE O/P EST LOW 20 MIN: CPT | Performed by: INTERNAL MEDICINE

## 2019-11-05 RX ORDER — AZITHROMYCIN 250 MG/1
TABLET, FILM COATED ORAL
Qty: 2 PACKAGE | Refills: 0 | Status: SHIPPED | OUTPATIENT
Start: 2019-11-05 | End: 2020-03-06

## 2019-11-05 NOTE — PROGRESS NOTES
HPI:    Patient ID: Mary Rausch is a 42year old female. HPI pt here for evaluation of sinus pressure drainage and headaches for about 2 weeks. Has seasonal allergies underlying. Has sulfa and pcn allergies.     Review of Systems   HENT: Positive for s normal.   Post nasal drip   Eyes: No scleral icterus. Cardiovascular: Normal rate, regular rhythm and normal heart sounds. Pulmonary/Chest: No respiratory distress. She has no wheezes. She has no rales.    Lymphadenopathy:     She has no cervical adenop

## 2019-12-01 NOTE — PROGRESS NOTES
[facility] Follow-up Visit    Sara apnea. Pertinent Family Hx:  - No family hx of esophageal, gastric or colon cancer  - No family history of IBD.     Prior endoscopies:  October 2018 EGD performed by Dr. 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 2   • lamoTRIgine 100 MG Oral Tab Take 250 mg by mouth daily.   1   • Omeprazole 40 MG Oral Capsule Delayed Release Take 1 caps shortness of breath  CARDIOVASCULAR:  negative for  chest pain  GASTROINTESTINAL:  see HPI  GENITOURINARY:  negative for dysuria and hematuria   SKIN:  negative for  rash  ALLERGIC/IMMUNOLOGIC:  negative for hay fever allergies  ENDOCRINE:  negative for c associated upper GI symptoms. She wishes to continue her current dose. However she questions whether Protonix could be the cause of her excessive flatus and foul-smelling stools.   There is no overt diarrhea/recent travel or antibiotic use that would sugg

## 2019-12-04 ENCOUNTER — OFFICE VISIT (OUTPATIENT)
Dept: GASTROENTEROLOGY | Facility: CLINIC | Age: 39
End: 2019-12-04
Payer: COMMERCIAL

## 2019-12-04 VITALS
DIASTOLIC BLOOD PRESSURE: 66 MMHG | SYSTOLIC BLOOD PRESSURE: 106 MMHG | BODY MASS INDEX: 33.11 KG/M2 | HEART RATE: 77 BPM | WEIGHT: 175.38 LBS | HEIGHT: 61 IN

## 2019-12-04 DIAGNOSIS — R14.3 EXCESSIVE FLATUS: ICD-10-CM

## 2019-12-04 DIAGNOSIS — K21.9 GASTROESOPHAGEAL REFLUX DISEASE, ESOPHAGITIS PRESENCE NOT SPECIFIED: Primary | ICD-10-CM

## 2019-12-04 PROCEDURE — 99214 OFFICE O/P EST MOD 30 MIN: CPT | Performed by: NURSE PRACTITIONER

## 2019-12-04 RX ORDER — DAPSONE 75 MG/G
7.5 GEL TOPICAL DAILY
Refills: 2 | COMMUNITY
Start: 2019-09-30

## 2019-12-04 RX ORDER — LAMOTRIGINE 100 MG/1
50 TABLET ORAL NIGHTLY
Refills: 1 | COMMUNITY
Start: 2019-11-19

## 2019-12-04 RX ORDER — OMEPRAZOLE 40 MG/1
40 CAPSULE, DELAYED RELEASE ORAL 2 TIMES DAILY
Qty: 180 CAPSULE | Refills: 0 | Status: SHIPPED | OUTPATIENT
Start: 2019-12-04 | End: 2020-03-03

## 2019-12-04 NOTE — PATIENT INSTRUCTIONS
1.  Stop Protonix and start omeprazole 40 mg twice daily  2.   Let me know how you are feeling over the next few months

## 2019-12-05 ENCOUNTER — TELEPHONE (OUTPATIENT)
Dept: GASTROENTEROLOGY | Facility: CLINIC | Age: 39
End: 2019-12-05

## 2019-12-05 NOTE — TELEPHONE ENCOUNTER
Current Outpatient Medications   Medication Sig Dispense Refill   • Omeprazole 40 MG Oral Capsule Delayed Release Take 1 capsule (40 mg total) by mouth 2 (two) times daily.  Take 1 capsule by mouth daily before breakfast. 180 capsule 0     Per pharmacy plea

## 2019-12-12 ENCOUNTER — TELEPHONE (OUTPATIENT)
Dept: RHEUMATOLOGY | Facility: CLINIC | Age: 39
End: 2019-12-12

## 2019-12-12 DIAGNOSIS — M45.8 ANKYLOSING SPONDYLITIS OF SACRAL REGION (HCC): Primary | ICD-10-CM

## 2019-12-12 DIAGNOSIS — Z51.81 ENCOUNTER FOR THERAPEUTIC DRUG MONITORING: ICD-10-CM

## 2019-12-12 NOTE — TELEPHONE ENCOUNTER
She will need maintance blood work as her last one was in feb 2019.  She also needs to make a f/u appt with dr. Camryn Dos Santos, last seen in June    Will refill it but she needs to get the above done

## 2019-12-12 NOTE — TELEPHONE ENCOUNTER
Pt aware refill approved. She will have follow up labs completed as advised. Standing lab order generated. Follow up appointment scheduled for 1/31/19 at 9 am [facility].

## 2019-12-12 NOTE — TELEPHONE ENCOUNTER
LOV: 6/14/2019   Please see below and advise on pt's request.  No future appointments. ASSESSMENT AND PLAN:   Max Byrd is a 41year old female who presents for Patient presents with: Ankylosing Spondylitis  Neck Pain        1.  Ankylosing spondyliti

## 2019-12-13 ENCOUNTER — TELEPHONE (OUTPATIENT)
Dept: RHEUMATOLOGY | Facility: CLINIC | Age: 39
End: 2019-12-13

## 2019-12-18 NOTE — TELEPHONE ENCOUNTER
PA approved through 12/16/2020. PA# [id number]. [facility] pharmacy technician Evette brandon. Additional clarification (ICD 10 and dosing) provided to pharmacist Kanu GRAY. Pharmacy will contact pt when medication is ready for shipping.

## 2020-01-24 ENCOUNTER — LAB ENCOUNTER (OUTPATIENT)
Dept: LAB | Age: 40
End: 2020-01-24
Attending: INTERNAL MEDICINE
Payer: COMMERCIAL

## 2020-01-24 DIAGNOSIS — M45.8 ANKYLOSING SPONDYLITIS OF SACRAL REGION (HCC): ICD-10-CM

## 2020-01-24 DIAGNOSIS — Z51.81 ENCOUNTER FOR THERAPEUTIC DRUG MONITORING: ICD-10-CM

## 2020-01-24 LAB
ALBUMIN SERPL-MCNC: 4 G/DL (ref 3.4–5)
ALT SERPL-CCNC: 24 U/L (ref 13–56)
AST SERPL-CCNC: 21 U/L (ref 15–37)
BASOPHILS # BLD AUTO: 0.03 X10(3) UL (ref 0–0.2)
BASOPHILS NFR BLD AUTO: 0.5 %
CREAT BLD-MCNC: 0.93 MG/DL (ref 0.55–1.02)
CRP SERPL-MCNC: <0.29 MG/DL (ref ?–0.3)
DEPRECATED RDW RBC AUTO: 37.1 FL (ref 35.1–46.3)
EOSINOPHIL # BLD AUTO: 0.07 X10(3) UL (ref 0–0.7)
EOSINOPHIL NFR BLD AUTO: 1.1 %
ERYTHROCYTE [DISTWIDTH] IN BLOOD BY AUTOMATED COUNT: 12.1 % (ref 11–15)
ERYTHROCYTE [SEDIMENTATION RATE] IN BLOOD: 13 MM/HR (ref 0–20)
HCT VFR BLD AUTO: 39.9 % (ref 35–48)
HGB BLD-MCNC: 13.4 G/DL (ref 12–16)
IMM GRANULOCYTES # BLD AUTO: 0.03 X10(3) UL (ref 0–1)
IMM GRANULOCYTES NFR BLD: 0.5 %
LYMPHOCYTES # BLD AUTO: 2.36 X10(3) UL (ref 1–4)
LYMPHOCYTES NFR BLD AUTO: 36.3 %
MCH RBC QN AUTO: 28.6 PG (ref 26–34)
MCHC RBC AUTO-ENTMCNC: 33.6 G/DL (ref 31–37)
MCV RBC AUTO: 85.3 FL (ref 80–100)
MONOCYTES # BLD AUTO: 0.39 X10(3) UL (ref 0.1–1)
MONOCYTES NFR BLD AUTO: 6 %
NEUTROPHILS # BLD AUTO: 3.62 X10 (3) UL (ref 1.5–7.7)
NEUTROPHILS # BLD AUTO: 3.62 X10(3) UL (ref 1.5–7.7)
NEUTROPHILS NFR BLD AUTO: 55.6 %
PLATELET # BLD AUTO: 314 10(3)UL (ref 150–450)
RBC # BLD AUTO: 4.68 X10(6)UL (ref 3.8–5.3)
WBC # BLD AUTO: 6.5 X10(3) UL (ref 4–11)

## 2020-01-24 PROCEDURE — 86140 C-REACTIVE PROTEIN: CPT

## 2020-01-24 PROCEDURE — 82040 ASSAY OF SERUM ALBUMIN: CPT

## 2020-01-24 PROCEDURE — 84450 TRANSFERASE (AST) (SGOT): CPT

## 2020-01-24 PROCEDURE — 85652 RBC SED RATE AUTOMATED: CPT

## 2020-01-24 PROCEDURE — 85025 COMPLETE CBC W/AUTO DIFF WBC: CPT

## 2020-01-24 PROCEDURE — 84460 ALANINE AMINO (ALT) (SGPT): CPT

## 2020-01-24 PROCEDURE — 36415 COLL VENOUS BLD VENIPUNCTURE: CPT

## 2020-01-24 PROCEDURE — 82565 ASSAY OF CREATININE: CPT

## 2020-03-06 ENCOUNTER — OFFICE VISIT (OUTPATIENT)
Dept: FAMILY MEDICINE CLINIC | Facility: CLINIC | Age: 40
End: 2020-03-06
Payer: COMMERCIAL

## 2020-03-06 VITALS
SYSTOLIC BLOOD PRESSURE: 115 MMHG | TEMPERATURE: 99 F | DIASTOLIC BLOOD PRESSURE: 70 MMHG | RESPIRATION RATE: 14 BRPM | WEIGHT: 175 LBS | BODY MASS INDEX: 33.04 KG/M2 | HEIGHT: 61 IN | HEART RATE: 80 BPM | OXYGEN SATURATION: 98 %

## 2020-03-06 DIAGNOSIS — S51.851A CAT BITE OF RIGHT FOREARM, INITIAL ENCOUNTER: Primary | ICD-10-CM
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
## 2020-03-06 PROCEDURE — 99203 OFFICE O/P NEW LOW 30 MIN: CPT | Performed by: NURSE PRACTITIONER

## 2020-03-06 PROCEDURE — 90471 IMMUNIZATION ADMIN: CPT | Performed by: NURSE PRACTITIONER

## 2020-03-06 PROCEDURE — 90715 TDAP VACCINE 7 YRS/> IM: CPT | Performed by: NURSE PRACTITIONER

## 2020-03-06 RX ORDER — CLINDAMYCIN HYDROCHLORIDE 300 MG/1
300 CAPSULE ORAL 4 TIMES DAILY
Qty: 40 CAPSULE | Refills: 0 | Status: SHIPPED | OUTPATIENT
Start: 2020-03-06 | End: 2020-03-16

## 2020-03-06 NOTE — PATIENT INSTRUCTIONS
Cat Bite    A cat bite can cause a wound deep enough to break the skin. In such cases, the wound is cleaned and then sometimes closed. If the wound is closed it is usually not closed completely.  This is so that fluid can drain if the wound becomes infect · If someone’s pet cat has bitten you, it should be kept in a secure area for the next 10 days to watch for signs of illness. If the pet owner won’t allow this, contact your local animal control center.  If the cat becomes ill or dies during that time, cont © 0662-9217 [employer] [address]. [address] [facility] [place], [address] [place]. All rights reserved. This information is not intended as a substitute for professional medical care. Always follow your healthcare professional's instructions.     ED if sympt

## 2020-03-06 NOTE — PROGRESS NOTES
CHIEF COMPLAINT:   Patient presents with:  Laceration Abrasion: cat bite      HPI:   Patient's presenting with animal bite:  Animal: cat  Owner of animal:  patient  Location: right forearm  Numbness: denies  Any associated injuries: denies  How long ago: 3 • Secukinumab (COSENTYX SENSOREADY PEN) 150 MG/ML Subcutaneous Solution Auto-injector Inject 150 mg into the skin every 28 days. (Patient not taking: Reported on 3/6/2020 ) 2 pen 1   • lidocaine 5 % External Patch as needed.   2   • escitalopram (LEXAPRO) 2 Tendon / deep structures in tact: Yes  Sensations intact: yes  NEURO: normal sensation to area. LYMPH: No lymphadenopathy to axilla. No cervical lymphadenopathy.        ASSESSMENT AND PLAN:     Assessment:  Cat bite of right forearm, initial encounter  (p · Always get medical attention for cat bites on the hand. They are highly likely to become infected. · Most wounds heal within 10 days. But an infection can occur even with proper treatment.  So be sure to check the wound daily for signs of infection (see Call your healthcare provider right away if any of these occur:  · Signs of infection:  ? Spreading redness or warmth from the wound  ? Increased pain or swelling  ? Fever of 100.4ºF (38ºC) or higher, or as directed by your healthcare provider  ?  Colored f

## 2020-03-16 NOTE — TELEPHONE ENCOUNTER
LOV: 6-14-19  Last Refilled:#2 pens, 1rf  12/12/19    Future Appointments   Date Time Provider Alexander Tadeo   4/6/2020  8:40 AM Franklin Ortega MD 2014 [facility]       Please advise.

## 2020-03-27 ENCOUNTER — PATIENT MESSAGE (OUTPATIENT)
Dept: RHEUMATOLOGY | Facility: CLINIC | Age: 40
End: 2020-03-27

## 2020-04-03 NOTE — TELEPHONE ENCOUNTER
Spoke with Patient. She would like to proceed with telephonic appt. To discuss medication and symptoms. Informed to expect a call on April 6 at 8:40a.m  Call may be from a private number. Patient verbalized understanding.   Future Appointments   [place]

## 2020-04-06 ENCOUNTER — VIRTUAL PHONE E/M (OUTPATIENT)
Dept: RHEUMATOLOGY | Facility: CLINIC | Age: 40
End: 2020-04-06
Payer: COMMERCIAL

## 2020-04-06 DIAGNOSIS — M45.8 ANKYLOSING SPONDYLITIS OF SACRAL REGION (HCC): Primary | ICD-10-CM

## 2020-04-06 DIAGNOSIS — Z51.81 ENCOUNTER FOR THERAPEUTIC DRUG MONITORING: ICD-10-CM

## 2020-04-06 PROCEDURE — 99214 OFFICE O/P EST MOD 30 MIN: CPT | Performed by: INTERNAL MEDICINE

## 2020-04-06 RX ORDER — IBUPROFEN 600 MG/1
600 TABLET ORAL EVERY 12 HOURS PRN
Qty: 60 TABLET | Refills: 1 | Status: SHIPPED | OUTPATIENT
Start: 2020-04-06 | End: 2020-12-18 | Stop reason: ALTCHOICE

## 2020-04-06 RX ORDER — CYCLOBENZAPRINE HCL 5 MG
TABLET ORAL
Qty: 180 TABLET | Refills: 1 | Status: SHIPPED | OUTPATIENT
Start: 2020-04-06 | End: 2020-12-18 | Stop reason: ALTCHOICE

## 2020-04-06 NOTE — PROGRESS NOTES
Virtual/Telephone Check-In    Fabiola Subramanian verbally consents to  a Virtual/Telephone Check-In service on 04/06/20. Patient understands and accepts financial responsibility for any deductible, co-insurance and/or co-pays associated with this service.     Du She returned to Dr. Lemuel Storm and dx with secondary fibromyalgia. She was told do 30-60min aerobic exercises. She feels 60-70% better with cosentyx. She has morning stiffness of 20-30min. It's constant. She has more pain on her left side.    She tried phy She has seen chiropracter on and off and gets adjustments - and signifnicant difference. She's doing PT exercises for her neck. 4/6/2020  TELEPHONE VISIT  She is not doing that well in terms of pain.  She made the decision about 1-2 months ago to stop • fluticasone-salmeterol (ADVAIR DISKUS) 100-50 MCG/DOSE Inhalation Aerosol Powder, Breath Activated Inhale 1 puff into the lungs 2 (two) times daily.  1 each 1   • Secukinumab (COSENTYX SENSOREADY PEN) 150 MG/ML Subcutaneous Solution Auto-injector Inject 1 • HERNIA SURGERY  2007   • KNEE ARTHROSCOPY  2007   • UPPER GI ENDOSCOPY,BIOPSY  2018      Family History   Problem Relation Age of Onset   • Lipids Father         Hyperlipidemia   • Hypertension Father    • Heart Disease Father         CAD   • Lipids Moth : no dysuria, no hx of miscarriages, no DVT Hx, no hx of OCP, 1 daughter, fever after delivery and sob during the delivery -   Daughter spent 1 week in the NICU - had meconium,   Had IUD - off of this now,   Neuro: gets occl  numbness or tingling down he Component      Latest Ref Rng & Units 5/14/2019   Anti-Sjogren's A      Negative Negative   Anti-Sjogren's B      Negative Negative   C-REACTIVE PROTEIN      <0.30 mg/dL <0.29   SED RATE      0 - 20 mm/Hr 9     Marlena:            [place]: EMG/NCV studies  S If not she wants to cont.  To hold cosentyx until May or June when more is known about how the pandemic is going  - has been on  cosenytx - started 8/2018 -   Flaca Alu to try ibuprofen 600mg twice a day - while holiding cosentyx   She can try to restart cosentyx

## 2020-04-28 NOTE — TELEPHONE ENCOUNTER
Requesting Refill of Omeprazole    Please review pended order below    LR - 2019    LOV-2019    Omeprazole 40 MG Oral Capsule Delayed Release () 180 capsule 0 2019 3/3/2020   Sig:   Take 1 capsule (40 mg total) by mouth 2 (two) times

## 2020-04-28 NOTE — TELEPHONE ENCOUNTER
Current Outpatient Medications   Medication Sig Dispense Refill   omeprazole DR 40MG caps    1 cap  Twice daily    Qty 90  Not on med list

## 2020-04-29 NOTE — TELEPHONE ENCOUNTER
Nursing: Can we clarify the pharmacy? .  The pharmacy currently listed is in [place]. Is this accurate?

## 2020-05-04 RX ORDER — OMEPRAZOLE 40 MG/1
40 CAPSULE, DELAYED RELEASE ORAL 2 TIMES DAILY
Qty: 180 CAPSULE | Refills: 2 | OUTPATIENT
Start: 2020-05-04 | End: 2020-08-02

## 2020-07-29 RX ORDER — SUMATRIPTAN 50 MG/1
TABLET, FILM COATED ORAL
Qty: 10 TABLET | Refills: 3 | Status: SHIPPED | OUTPATIENT
Start: 2020-07-29 | End: 2021-01-06

## 2020-09-29 ENCOUNTER — TELEMEDICINE (OUTPATIENT)
Dept: INTERNAL MEDICINE CLINIC | Facility: CLINIC | Age: 40
End: 2020-09-29

## 2020-09-29 DIAGNOSIS — J01.00 ACUTE MAXILLARY SINUSITIS, RECURRENCE NOT SPECIFIED: Primary | ICD-10-CM

## 2020-09-29 PROCEDURE — 99213 OFFICE O/P EST LOW 20 MIN: CPT | Performed by: INTERNAL MEDICINE

## 2020-09-29 RX ORDER — LEVOFLOXACIN 500 MG/1
500 TABLET, FILM COATED ORAL DAILY
Qty: 10 TABLET | Refills: 0 | Status: SHIPPED | OUTPATIENT
Start: 2020-09-29 | End: 2020-10-09

## 2020-09-29 NOTE — PROGRESS NOTES
Virtual Telephone Check-In    Antonio Given verbally consents to a Virtual/Telephone Check-In visit on 09/29/20. Patient has been referred to the [facility] website at [email or web address] to review the yearly Consent to Treat document.     Patient understand SENSOREADY PEN) 150 MG/ML Subcutaneous Solution Auto-injector Inject 150 mg into the skin every 28 days. 2 pen 1   • ACZONE 7.5 % External Gel Place 7.5 Application onto the skin daily. 2   • lamoTRIgine 100 MG Oral Tab Take 250 mg by mouth daily.   1   •

## 2020-11-18 ENCOUNTER — TELEMEDICINE (OUTPATIENT)
Dept: INTERNAL MEDICINE CLINIC | Facility: CLINIC | Age: 40
End: 2020-11-18
Payer: COMMERCIAL

## 2020-11-18 DIAGNOSIS — K21.00 GASTROESOPHAGEAL REFLUX DISEASE WITH ESOPHAGITIS WITHOUT HEMORRHAGE: Primary | ICD-10-CM

## 2020-11-18 PROCEDURE — 99212 OFFICE O/P EST SF 10 MIN: CPT | Performed by: INTERNAL MEDICINE

## 2020-11-18 NOTE — PROGRESS NOTES
Virtual Telephone Check-In    Nayely Rosas verbally consents to a Virtual/Telephone Check-In visit on 11/18/20. Patient has been referred to the [facility] website at [email or web address] to review the yearly Consent to Treat document.     Patient understand (LEXAPRO) 20 MG Oral Tab Take 20 mg by mouth daily. • Cetirizine HCl (ZYRTEC OR) Take by mouth daily. • Secukinumab (COSENTYX SENSOREADY PEN) 150 MG/ML Subcutaneous Solution Auto-injector Inject 150 mg into the skin every 30 (thirty) days.

## 2020-11-20 RX ORDER — METOCLOPRAMIDE 10 MG/1
10 TABLET ORAL
Qty: 30 TABLET | Refills: 3 | Status: SHIPPED | OUTPATIENT
Start: 2020-11-20 | End: 2020-12-18 | Stop reason: ALTCHOICE

## 2020-12-08 ENCOUNTER — LAB ENCOUNTER (OUTPATIENT)
Dept: LAB | Age: 40
End: 2020-12-08
Attending: INTERNAL MEDICINE
Payer: COMMERCIAL

## 2020-12-08 DIAGNOSIS — L70.0 NODULAR ELASTOSIS WITH CYSTS AND COMEDONES OF FAVRE AND RACOUCHOT: Primary | ICD-10-CM

## 2020-12-08 DIAGNOSIS — Z51.81 ENCOUNTER FOR THERAPEUTIC DRUG MONITORING: ICD-10-CM

## 2020-12-08 DIAGNOSIS — M45.8 ANKYLOSING SPONDYLITIS OF SACRAL REGION (HCC): ICD-10-CM

## 2020-12-08 DIAGNOSIS — L57.8 NODULAR ELASTOSIS WITH CYSTS AND COMEDONES OF FAVRE AND RACOUCHOT: Primary | ICD-10-CM

## 2020-12-08 DIAGNOSIS — Z79.899 NEED FOR PROPHYLACTIC CHEMOTHERAPY: ICD-10-CM

## 2020-12-08 PROCEDURE — 80048 BASIC METABOLIC PNL TOTAL CA: CPT

## 2020-12-08 PROCEDURE — 86140 C-REACTIVE PROTEIN: CPT

## 2020-12-08 PROCEDURE — 82040 ASSAY OF SERUM ALBUMIN: CPT

## 2020-12-08 PROCEDURE — 36415 COLL VENOUS BLD VENIPUNCTURE: CPT

## 2020-12-08 PROCEDURE — 85025 COMPLETE CBC W/AUTO DIFF WBC: CPT

## 2020-12-08 PROCEDURE — 85652 RBC SED RATE AUTOMATED: CPT

## 2020-12-08 PROCEDURE — 84450 TRANSFERASE (AST) (SGOT): CPT

## 2020-12-08 PROCEDURE — 84460 ALANINE AMINO (ALT) (SGPT): CPT

## 2020-12-17 ENCOUNTER — TELEPHONE (OUTPATIENT)
Dept: INTERNAL MEDICINE CLINIC | Facility: CLINIC | Age: 40
End: 2020-12-17

## 2020-12-17 NOTE — TELEPHONE ENCOUNTER
They dont use cover my meds, instead they use rxb. TOMODO. [place]   Pt needs a prior auth for her Dexilant

## 2020-12-18 ENCOUNTER — OFFICE VISIT (OUTPATIENT)
Dept: RHEUMATOLOGY | Facility: CLINIC | Age: 40
End: 2020-12-18
Payer: COMMERCIAL

## 2020-12-18 ENCOUNTER — LAB ENCOUNTER (OUTPATIENT)
Dept: LAB | Facility: HOSPITAL | Age: 40
End: 2020-12-18
Attending: INTERNAL MEDICINE
Payer: COMMERCIAL

## 2020-12-18 ENCOUNTER — TELEPHONE (OUTPATIENT)
Dept: RHEUMATOLOGY | Facility: CLINIC | Age: 40
End: 2020-12-18

## 2020-12-18 VITALS
WEIGHT: 176 LBS | HEART RATE: 87 BPM | SYSTOLIC BLOOD PRESSURE: 116 MMHG | HEIGHT: 61 IN | DIASTOLIC BLOOD PRESSURE: 77 MMHG | BODY MASS INDEX: 33.23 KG/M2 | RESPIRATION RATE: 16 BRPM

## 2020-12-18 DIAGNOSIS — M45.9 ANKYLOSING SPONDYLITIS, UNSPECIFIED SITE OF SPINE (HCC): Primary | ICD-10-CM

## 2020-12-18 DIAGNOSIS — Z51.81 ENCOUNTER FOR THERAPEUTIC DRUG MONITORING: Primary | ICD-10-CM

## 2020-12-18 DIAGNOSIS — Z51.81 THERAPEUTIC DRUG MONITORING: ICD-10-CM

## 2020-12-18 DIAGNOSIS — M45.9 ANKYLOSING SPONDYLITIS (HCC): ICD-10-CM

## 2020-12-18 PROCEDURE — 99214 OFFICE O/P EST MOD 30 MIN: CPT | Performed by: INTERNAL MEDICINE

## 2020-12-18 PROCEDURE — 3074F SYST BP LT 130 MM HG: CPT | Performed by: INTERNAL MEDICINE

## 2020-12-18 PROCEDURE — 86480 TB TEST CELL IMMUN MEASURE: CPT

## 2020-12-18 PROCEDURE — 3008F BODY MASS INDEX DOCD: CPT | Performed by: INTERNAL MEDICINE

## 2020-12-18 PROCEDURE — 36415 COLL VENOUS BLD VENIPUNCTURE: CPT

## 2020-12-18 PROCEDURE — 3078F DIAST BP <80 MM HG: CPT | Performed by: INTERNAL MEDICINE

## 2020-12-18 RX ORDER — SECUKINUMAB 150 MG/ML
150 INJECTION SUBCUTANEOUS
Qty: 1 PEN | Refills: 5 | Status: SHIPPED | OUTPATIENT
Start: 2020-12-18 | End: 2021-05-12

## 2020-12-18 NOTE — PATIENT INSTRUCTIONS
1. Cont. flexeril as needed   2. Restart  consentyx 150mg a month   3. Ibuprofen is ok   4. Return to clinic in 6 months   5. Check labs in 6 months.

## 2020-12-18 NOTE — PROGRESS NOTES
Fabiola Subramanian is a 47year old female who presents for Patient presents with: Ankylosing Spondylitis  Lab Results  Medication Follow-Up  Joint Pain: Low back, upper neck pain, toe pain, muscle pain   Foot Pain: left   .    HPI:     I had the pleasure of physical therapy before treatment and that flared her back. Her feet and ankles and shoulders are hurting. She was told it might be tendonitis. She's in PT for left shoulder right now for rotator cuff tendonitis. shes' taking aleve prn.  It takes the coronovirus. She was scared to be immunosuppressed. She was doing better on cosentyx overall. She felt she was pretty maintained. Since stopping it, she has been having more lower back pain - lumbar area and si joints.    She has been going for walks HOURS AS NEEDED MIGRAINES. DO NOT TAKE MORE TAKE 2 PILLS A DAY. 10 tablet 3   • Albuterol Sulfate HFA (VENTOLIN HFA) 108 (90 Base) MCG/ACT Inhalation Aero Soln Inhale 2 puffs into the lungs every 4 (four) hours as needed for Wheezing.  1 Inhaler 6   • ACZON Hyperlipidemia   • Lipids Paternal Grandmother         Hyperlipidemia   • Diabetes Paternal Grandfather    • Lipids Paternal Grandfather         Hyperlipidemia   grandmother - dermatomyositis.    Had young brother pass from brain tumor at age 5   Aunts have her,   ENDO: no hx of thyroid disease, no hx of DM  Joint/Muscluskeltal: see HPI,   All other ROS are negative.      EXAM:   HEENT: Clear oropharynx, no oral ulcers, EOM intact, clear sclear, PERRLA, pleasant, no acute distress, no CAD, neck tendnerness, go PROTEIN      <0.30 mg/dL <0.29   SED RATE      0 - 20 mm/Hr 9     Marlena:        Component      Latest Ref Rng & Units 12/8/2020   WBC      4.0 - 11.0 x10(3) uL 6.4   RBC      3.80 - 5.30 x10(6)uL 4.67   Hemoglobin      12.0 - 16.0 g/dL 13.4   Hematocrit performed with surface electrodes.  In addition,   left median, ulnar, peroneal and tibial motor responses were   obtained.  Left median, ulnar, common peroneal and tibial F waves   were acquired.  Unless otherwise noted, upper extremity   temperature was migraines -   Better on  cyclobenzapinre 5mg at night - -   10mg at night is not as helpful  But she will try and see if 2.5mg in day and 5mg is helpful on days she is not working.    .   - off tizandine 2mg a tnight - this helped inintally but stopped work

## 2020-12-28 NOTE — PROGRESS NOTES
[facility] Follow-up Visit    Sara IBD.     Prior endoscopies:  October 2018 EGD performed by Dr. 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 • sucralfate (CARAFATE) 1 g Oral Tab Take 1 tablet (1 g total) by mouth 3 (three) times daily before meals.  90 tablet 0   • Pantoprazole Sodium 40 MG Oral Tab EC Take 1 tablet (40 mg total) by mouth every mor negative for depressed mood    PHYSICAL EXAM:   Blood pressure 114/70, pulse 81, height 5' 1\" (1.549 m), weight 179 lb (81.2 kg), last menstrual period 03/02/2020, not currently breastfeeding.     Gen: patient appears comfortable and in no acute distress pantoprazole per her PCP about a week ago. Symptoms are similar to her prior evaluation. I suspect it is likely due to underlying gastritis that has been suboptimally managed. This may also been negatively impacted by stressors.  We discussed GERD lifestyle

## 2020-12-29 ENCOUNTER — OFFICE VISIT (OUTPATIENT)
Dept: GASTROENTEROLOGY | Facility: CLINIC | Age: 40
End: 2020-12-29
Payer: COMMERCIAL

## 2020-12-29 VITALS
BODY MASS INDEX: 33.79 KG/M2 | SYSTOLIC BLOOD PRESSURE: 114 MMHG | DIASTOLIC BLOOD PRESSURE: 70 MMHG | WEIGHT: 179 LBS | HEIGHT: 61 IN | HEART RATE: 81 BPM

## 2020-12-29 DIAGNOSIS — K21.9 GASTROESOPHAGEAL REFLUX DISEASE, UNSPECIFIED WHETHER ESOPHAGITIS PRESENT: Primary | ICD-10-CM

## 2020-12-29 PROCEDURE — 99214 OFFICE O/P EST MOD 30 MIN: CPT | Performed by: NURSE PRACTITIONER

## 2020-12-29 PROCEDURE — 3078F DIAST BP <80 MM HG: CPT | Performed by: NURSE PRACTITIONER

## 2020-12-29 PROCEDURE — 3074F SYST BP LT 130 MM HG: CPT | Performed by: NURSE PRACTITIONER

## 2020-12-29 PROCEDURE — 3008F BODY MASS INDEX DOCD: CPT | Performed by: NURSE PRACTITIONER

## 2020-12-29 RX ORDER — SUCRALFATE 1 G/1
1 TABLET ORAL
Qty: 90 TABLET | Refills: 0 | Status: SHIPPED | OUTPATIENT
Start: 2020-12-29 | End: 2021-01-28

## 2020-12-29 NOTE — PATIENT INSTRUCTIONS
-Continue pantoprazole 40 mg daily  -Start Carafate dissolved in water as needed  -GERD lifestyle/dietary modifications as below  -Follow-up in the spring/summer 2021 or sooner if new issues arise        Avoid Heartburn Triggers  - Laying down after meals

## 2021-01-05 NOTE — TELEPHONE ENCOUNTER
Drug/Service Name: [facility] DR 61 MG CAPSULE Physician/Nurse: Rosa Clemente  EOC ID: [id number] Status: Approved  Date Requested: 12/18/2020 12:47:43 Date Closed: 12/29/2020 10:34:54  Dispensing Location:

## 2021-01-06 RX ORDER — SUMATRIPTAN 50 MG/1
50 TABLET, FILM COATED ORAL EVERY 2 HOUR PRN
Qty: 10 TABLET | Refills: 3 | Status: SHIPPED | OUTPATIENT
Start: 2021-01-06

## 2021-01-06 NOTE — TELEPHONE ENCOUNTER
Requested Prescriptions     Pending Prescriptions Disp Refills   • SUMAtriptan Succinate 50 MG Oral Tab 10 tablet 3     Sig: Take 1 tablet (50 mg total) by mouth every 2 (two) hours as needed for Migraine.  DO NOT TAKE MORE TAKE 2 PILLS A DAY     Last offic

## 2021-01-25 ENCOUNTER — TELEPHONE (OUTPATIENT)
Dept: GASTROENTEROLOGY | Facility: CLINIC | Age: 41
End: 2021-01-25

## 2021-01-25 RX ORDER — DEXLANSOPRAZOLE 60 MG/1
60 CAPSULE, DELAYED RELEASE ORAL DAILY
Qty: 90 CAPSULE | Refills: 1 | Status: SHIPPED | OUTPATIENT
Start: 2021-01-25 | End: 2021-08-02

## 2021-01-25 NOTE — TELEPHONE ENCOUNTER
Abigail Osorio    Patient requesting prescription for Dexilant-per below message it has been approved through insurance.       Thank you

## 2021-01-25 NOTE — TELEPHONE ENCOUNTER
Calling to talk to RN about ins coverage for rx Sharan Gupta has been approved - asking for rx to be sent Fall with Harm Risk

## 2021-03-26 NOTE — TELEPHONE ENCOUNTER
Requested Prescriptions     Pending Prescriptions Disp Refills   • Dexlansoprazole 60 MG Oral Capsule Delayed Release 90 capsule 1     Sig: Take 60 mg by mouth daily.      LOV: 12/29/2020  Last Refill: 01/25/2021

## 2021-03-29 RX ORDER — DEXLANSOPRAZOLE 60 MG/1
60 CAPSULE, DELAYED RELEASE ORAL DAILY
Qty: 90 CAPSULE | Refills: 1 | OUTPATIENT
Start: 2021-03-29 | End: 2021-06-27

## 2021-05-12 RX ORDER — SECUKINUMAB 150 MG/ML
INJECTION SUBCUTANEOUS
Qty: 1 PEN | Refills: 5 | Status: SHIPPED | OUTPATIENT
Start: 2021-05-12 | End: 2021-05-25

## 2021-05-12 NOTE — TELEPHONE ENCOUNTER
Last filled: 12/18/20 #1 pen with 5 refills   LOV: 12/18/20  Future Appointments   Date Time Provider Alexander Tadeo   6/18/2021  8:40 AM Sanjuana Melo MD 2014 [facility]     Labs: 12/18/20     Summary:  1. Cont. flexeril as needed   2.  Joey Metcalf

## 2021-05-25 ENCOUNTER — OFFICE VISIT (OUTPATIENT)
Dept: RHEUMATOLOGY | Facility: CLINIC | Age: 41
End: 2021-05-25
Payer: COMMERCIAL

## 2021-05-25 ENCOUNTER — TELEPHONE (OUTPATIENT)
Dept: RHEUMATOLOGY | Facility: CLINIC | Age: 41
End: 2021-05-25

## 2021-05-25 VITALS
DIASTOLIC BLOOD PRESSURE: 79 MMHG | BODY MASS INDEX: 35.3 KG/M2 | WEIGHT: 187 LBS | HEART RATE: 71 BPM | RESPIRATION RATE: 16 BRPM | SYSTOLIC BLOOD PRESSURE: 115 MMHG | HEIGHT: 61 IN

## 2021-05-25 DIAGNOSIS — M45.9 ANKYLOSING SPONDYLITIS, UNSPECIFIED SITE OF SPINE (HCC): Primary | ICD-10-CM

## 2021-05-25 DIAGNOSIS — E55.9 VITAMIN D DEFICIENCY: ICD-10-CM

## 2021-05-25 DIAGNOSIS — Z51.81 THERAPEUTIC DRUG MONITORING: ICD-10-CM

## 2021-05-25 PROCEDURE — 3078F DIAST BP <80 MM HG: CPT | Performed by: INTERNAL MEDICINE

## 2021-05-25 PROCEDURE — 3008F BODY MASS INDEX DOCD: CPT | Performed by: INTERNAL MEDICINE

## 2021-05-25 PROCEDURE — 99214 OFFICE O/P EST MOD 30 MIN: CPT | Performed by: INTERNAL MEDICINE

## 2021-05-25 PROCEDURE — 3074F SYST BP LT 130 MM HG: CPT | Performed by: INTERNAL MEDICINE

## 2021-05-25 RX ORDER — BACLOFEN 5 MG/1
5 TABLET ORAL 2 TIMES DAILY
Qty: 60 TABLET | Refills: 0 | Status: SHIPPED | OUTPATIENT
Start: 2021-05-25 | End: 2021-08-25

## 2021-05-25 RX ORDER — SECUKINUMAB 150 MG/ML
300 INJECTION SUBCUTANEOUS
Qty: 2 ML | Refills: 5 | Status: SHIPPED | OUTPATIENT
Start: 2021-05-25 | End: 2021-09-28

## 2021-05-25 RX ORDER — SECUKINUMAB 150 MG/ML
300 INJECTION SUBCUTANEOUS
Qty: 2 ML | Refills: 5 | Status: SHIPPED | OUTPATIENT
Start: 2021-05-25 | End: 2021-05-25

## 2021-05-25 NOTE — PROGRESS NOTES
Eric Swartz is a 47year old female who presents for Patient presents with:   Ankylosing Spondylitis  Neck Pain  Back Pain  Medication Follow-Up  Shoulder Pain: LEFT  Hip Pain: LEFT  .   HPI:     I had the pleasure of seeing Eric Swartz on 5/14/2019 f that flared her back. Her feet and ankles and shoulders are hurting. She was told it might be tendonitis. She's in PT for left shoulder right now for rotator cuff tendonitis. shes' taking aleve prn. It takes the edge off. No hx of psoriasis.  She immunosuppressed. She was doing better on cosentyx overall. She felt she was pretty maintained. Since stopping it, she has been having more lower back pain - lumbar area and si joints.    She has been going for walks with her daughter and its' difficult ligaments and left her non functional.   She is fully vaccinated covid in January and February -   She hasn't felt as well as she was in the past with coxentyx. She feels that through the pandemic she was sedentary and dind't notice any changes.  But sinc Allergic rhinitis    • Ankylosing spondylitis (HCC)    • Anxiety    • Anxiety state, unspecified    • Asthma    • Depression    • Extrinsic asthma, unspecified    • GERD (gastroesophageal reflux disease)    • History of nephrolithiasis    • Hx of sinus edvin disease, had EKG - in fall 2018 had palpiations and went to ER and it was normal,   RS: No SOB, no Cough, No Pleurtic pain,   GI: younger she had IBS and she has GERd.  She had EGD in 10/2018 - had chronic GERD, she had colonoscopy years ago that was normal 450.0 10(3)uL 314.0   Prelim Neutrophil Abs      1.50 - 7.70 x10 (3) uL 3.62   Neutrophils Absolute      1.50 - 7.70 x10(3) uL 3.62   Lymphocytes Absolute      1.00 - 4.00 x10(3) uL 2.36   Monocytes Absolute      0.10 - 1.00 x10(3) uL 0.39   Eosinophils Ab %      % 28.0   Monocytes %      % 5.9   Eosinophils %      % 1.2   Basophils %      % 0.5   Immature Granulocyte %      % 0.6   Glucose      70 - 99 mg/dL 101 (H)   Sodium      136 - 145 mmol/L 140   Potassium      3.5 - 5.1 mmol/L 3.8   Chloride      98 protrusion at C5-6 with mild narrowing of the right neural foramina. Otherwise, no spinal canal or foraminal narrowing elsewhere in the cervical spine.     ASSESSMENT AND PLAN:   Rossy Hooper is a 47year old female who presents for Patient presents with: baclofen 5-10mg at night   2. Increase   consentyx to 300mg a month   3. Ibuprofen is ok   4. Return to clinic in 3 months   5. Check labs in 3 months.      In the past . PT for lower back - left side - also consider  marybeth chi or yoga or heated pool stretchi

## 2021-05-25 NOTE — PATIENT INSTRUCTIONS
1. Stop flexeril - try baclofen 5-10mg at night   2. Increase   consentyx to 300mg a month   3. Ibuprofen is ok   4. Return to clinic in 3 months   5. Check labs in 3 months.

## 2021-05-25 NOTE — TELEPHONE ENCOUNTER
VÍCTOR Dalton, no PA required. New Cosentyx Rx sent to pharmacy. Per Epic:   Closed  5/25/2021 12:16 PM  Close reason: Prior Authorization not required for patient/medication   Note from payer: Your PA has been resolved, no additional PA is required.  For

## 2021-05-25 NOTE — TELEPHONE ENCOUNTER
Will need to increase cosentyx to 300mg  A months (every 4 weeks) = can we call to get this authorized

## 2021-06-28 ENCOUNTER — TELEPHONE (OUTPATIENT)
Dept: RHEUMATOLOGY | Facility: CLINIC | Age: 41
End: 2021-06-28

## 2021-07-30 ENCOUNTER — OFFICE VISIT (OUTPATIENT)
Dept: INTERNAL MEDICINE CLINIC | Facility: CLINIC | Age: 41
End: 2021-07-30
Payer: COMMERCIAL

## 2021-07-30 VITALS
HEIGHT: 61 IN | SYSTOLIC BLOOD PRESSURE: 112 MMHG | BODY MASS INDEX: 34.17 KG/M2 | HEART RATE: 84 BPM | WEIGHT: 181 LBS | DIASTOLIC BLOOD PRESSURE: 80 MMHG | TEMPERATURE: 98 F | OXYGEN SATURATION: 98 %

## 2021-07-30 DIAGNOSIS — R53.83 OTHER FATIGUE: ICD-10-CM

## 2021-07-30 DIAGNOSIS — Z00.00 WELLNESS EXAMINATION: Primary | ICD-10-CM

## 2021-07-30 DIAGNOSIS — G47.9 SLEEP DISTURBANCE: ICD-10-CM

## 2021-07-30 LAB
ALBUMIN SERPL-MCNC: 3.7 G/DL (ref 3.4–5)
ALBUMIN/GLOB SERPL: 1 {RATIO} (ref 1–2)
ALP LIVER SERPL-CCNC: 86 U/L
ALT SERPL-CCNC: 27 U/L
ANION GAP SERPL CALC-SCNC: 9 MMOL/L (ref 0–18)
AST SERPL-CCNC: 13 U/L (ref 15–37)
BASOPHILS # BLD AUTO: 0.03 X10(3) UL (ref 0–0.2)
BASOPHILS NFR BLD AUTO: 0.4 %
BILIRUB SERPL-MCNC: 0.4 MG/DL (ref 0.1–2)
BUN BLD-MCNC: 9 MG/DL (ref 7–18)
BUN/CREAT SERPL: 12.7 (ref 10–20)
CALCIUM BLD-MCNC: 9.1 MG/DL (ref 8.5–10.1)
CHLORIDE SERPL-SCNC: 106 MMOL/L (ref 98–112)
CHOLEST SMN-MCNC: 212 MG/DL (ref ?–200)
CO2 SERPL-SCNC: 25 MMOL/L (ref 21–32)
CREAT BLD-MCNC: 0.71 MG/DL
DEPRECATED RDW RBC AUTO: 41.6 FL (ref 35.1–46.3)
EOSINOPHIL # BLD AUTO: 0.07 X10(3) UL (ref 0–0.7)
EOSINOPHIL NFR BLD AUTO: 1 %
ERYTHROCYTE [DISTWIDTH] IN BLOOD BY AUTOMATED COUNT: 12.6 % (ref 11–15)
GLOBULIN PLAS-MCNC: 3.8 G/DL (ref 2.8–4.4)
GLUCOSE BLD-MCNC: 74 MG/DL (ref 70–99)
HCT VFR BLD AUTO: 39.8 %
HDLC SERPL-MCNC: 39 MG/DL (ref 40–59)
HGB BLD-MCNC: 12.7 G/DL
IMM GRANULOCYTES # BLD AUTO: 0.03 X10(3) UL (ref 0–1)
IMM GRANULOCYTES NFR BLD: 0.4 %
LDLC SERPL CALC-MCNC: 138 MG/DL (ref ?–100)
LYMPHOCYTES # BLD AUTO: 1.96 X10(3) UL (ref 1–4)
LYMPHOCYTES NFR BLD AUTO: 28.1 %
M PROTEIN MFR SERPL ELPH: 7.5 G/DL (ref 6.4–8.2)
MCH RBC QN AUTO: 28.5 PG (ref 26–34)
MCHC RBC AUTO-ENTMCNC: 31.9 G/DL (ref 31–37)
MCV RBC AUTO: 89.4 FL
MONOCYTES # BLD AUTO: 0.46 X10(3) UL (ref 0.1–1)
MONOCYTES NFR BLD AUTO: 6.6 %
NEUTROPHILS # BLD AUTO: 4.43 X10 (3) UL (ref 1.5–7.7)
NEUTROPHILS # BLD AUTO: 4.43 X10(3) UL (ref 1.5–7.7)
NEUTROPHILS NFR BLD AUTO: 63.5 %
NONHDLC SERPL-MCNC: 173 MG/DL (ref ?–130)
OSMOLALITY SERPL CALC.SUM OF ELEC: 287 MOSM/KG (ref 275–295)
PATIENT FASTING Y/N/NP: YES
PATIENT FASTING Y/N/NP: YES
PLATELET # BLD AUTO: 319 10(3)UL (ref 150–450)
POTASSIUM SERPL-SCNC: 3.9 MMOL/L (ref 3.5–5.1)
RBC # BLD AUTO: 4.45 X10(6)UL
SODIUM SERPL-SCNC: 140 MMOL/L (ref 136–145)
T4 FREE SERPL-MCNC: 0.8 NG/DL (ref 0.8–1.7)
THYROPEROXIDASE AB SERPL-ACNC: <28 U/ML (ref ?–60)
TRIGL SERPL-MCNC: 192 MG/DL (ref 30–149)
TSI SER-ACNC: 1.5 MIU/ML (ref 0.36–3.74)
VIT B12 SERPL-MCNC: 236 PG/ML (ref 193–986)
VLDLC SERPL CALC-MCNC: 36 MG/DL (ref 0–30)
WBC # BLD AUTO: 7 X10(3) UL (ref 4–11)

## 2021-07-30 PROCEDURE — 80061 LIPID PANEL: CPT | Performed by: INTERNAL MEDICINE

## 2021-07-30 PROCEDURE — 86376 MICROSOMAL ANTIBODY EACH: CPT | Performed by: INTERNAL MEDICINE

## 2021-07-30 PROCEDURE — 82607 VITAMIN B-12: CPT | Performed by: INTERNAL MEDICINE

## 2021-07-30 PROCEDURE — 3008F BODY MASS INDEX DOCD: CPT | Performed by: INTERNAL MEDICINE

## 2021-07-30 PROCEDURE — 80050 GENERAL HEALTH PANEL: CPT | Performed by: INTERNAL MEDICINE

## 2021-07-30 PROCEDURE — 3079F DIAST BP 80-89 MM HG: CPT | Performed by: INTERNAL MEDICINE

## 2021-07-30 PROCEDURE — 82306 VITAMIN D 25 HYDROXY: CPT | Performed by: INTERNAL MEDICINE

## 2021-07-30 PROCEDURE — 3074F SYST BP LT 130 MM HG: CPT | Performed by: INTERNAL MEDICINE

## 2021-07-30 PROCEDURE — 99395 PREV VISIT EST AGE 18-39: CPT | Performed by: INTERNAL MEDICINE

## 2021-07-30 PROCEDURE — 84439 ASSAY OF FREE THYROXINE: CPT | Performed by: INTERNAL MEDICINE

## 2021-07-30 RX ORDER — TRETINOIN 0.025 %
CREAM (GRAM) TOPICAL NIGHTLY
COMMUNITY

## 2021-07-30 RX ORDER — DOXYCYCLINE 100 MG/1
CAPSULE ORAL
COMMUNITY
Start: 2021-07-15 | End: 2021-11-04

## 2021-07-30 NOTE — TELEPHONE ENCOUNTER
Requested Prescriptions     Pending Prescriptions Disp Refills   • DEXILANT 60 MG Oral Capsule Delayed Release [Pharmacy Med Name: [address] [place] [address] CAP 60MG DR] 90 capsule 1     Sig: TAKE 1 CAPSULE DAILY     LOV: 12/29/2020  Last Refill: 01/25/2021

## 2021-07-30 NOTE — PROGRESS NOTES
HPI/Subjective:   Patient ID: Francesca Moya is a 46year old female. HPI Patient here for a wellness exam and to address issues of sleep disturbance which she attributes with pain from Ankylosing spodylitis. Has had a sleep study which was negative.   Hilaria Gallardo WITH FOOD     • Fexofenadine HCl (ALLEGRA) 180 MG Oral Tab Take 180 mg by mouth daily.  (Patient not taking: Reported on 5/25/2021 )       Allergies:  Penicillins                 Comment:Other reaction(s): Rash  Sulfanilamide               Comment:Other melva

## 2021-08-02 LAB — 25(OH)D3 SERPL-MCNC: 21.5 NG/ML (ref 30–100)

## 2021-08-02 RX ORDER — ERGOCALCIFEROL 1.25 MG/1
50000 CAPSULE ORAL WEEKLY
Qty: 12 CAPSULE | Refills: 1 | Status: SHIPPED | OUTPATIENT
Start: 2021-08-02 | End: 2021-09-01

## 2021-08-02 NOTE — TELEPHONE ENCOUNTER
Left message to call back and schedule appointment. Call Center if patient returns call, please schedule Office visit with Genny Wolff. Thank you !

## 2021-08-02 NOTE — TELEPHONE ENCOUNTER
Nursing: Rx has been refilled x90 days. The patient is due for follow-up per my previous office recommendations.   This can be scheduled nonurgently in the next few months

## 2021-08-09 ENCOUNTER — TELEPHONE (OUTPATIENT)
Dept: INTERNAL MEDICINE CLINIC | Facility: CLINIC | Age: 41
End: 2021-08-09

## 2021-08-09 NOTE — TELEPHONE ENCOUNTER
Diarrhea on and off for a couple of weeks. Stopped for a short while and restarted this weekend. No BM today. Headache, dizzy, behind the ear ache. Has taken ibuprofen for spondylosis which has also helped the headache.     Others in the home have had di

## 2021-08-09 NOTE — TELEPHONE ENCOUNTER
Patient would like a return call from the nurse. She needs to discuss symptoms she's having and if the nurse feels she needs to schedule an appointment or not.

## 2021-08-09 NOTE — TELEPHONE ENCOUNTER
Patient left a voicemail returning Heide's phone call. In regards to symptoms she developed over this weekend.     Chills  Dizziness  And diarrhea

## 2021-08-11 ENCOUNTER — OFFICE VISIT (OUTPATIENT)
Dept: FAMILY MEDICINE CLINIC | Facility: CLINIC | Age: 41
End: 2021-08-11
Payer: COMMERCIAL

## 2021-08-11 VITALS
SYSTOLIC BLOOD PRESSURE: 122 MMHG | WEIGHT: 180 LBS | OXYGEN SATURATION: 98 % | BODY MASS INDEX: 33.99 KG/M2 | HEIGHT: 61 IN | HEART RATE: 85 BPM | DIASTOLIC BLOOD PRESSURE: 80 MMHG | RESPIRATION RATE: 16 BRPM | TEMPERATURE: 99 F

## 2021-08-11 DIAGNOSIS — J02.9 PHARYNGITIS, UNSPECIFIED ETIOLOGY: ICD-10-CM

## 2021-08-11 DIAGNOSIS — J00 ACUTE NASOPHARYNGITIS: Primary | ICD-10-CM

## 2021-08-11 LAB
CONTROL LINE PRESENT WITH A CLEAR BACKGROUND (YES/NO): YES YES/NO
KIT LOT #: NORMAL NUMERIC
STREP GRP A CUL-SCR: NEGATIVE

## 2021-08-11 PROCEDURE — 99213 OFFICE O/P EST LOW 20 MIN: CPT | Performed by: NURSE PRACTITIONER

## 2021-08-11 PROCEDURE — 3079F DIAST BP 80-89 MM HG: CPT | Performed by: NURSE PRACTITIONER

## 2021-08-11 PROCEDURE — 3074F SYST BP LT 130 MM HG: CPT | Performed by: NURSE PRACTITIONER

## 2021-08-11 PROCEDURE — 3008F BODY MASS INDEX DOCD: CPT | Performed by: NURSE PRACTITIONER

## 2021-08-11 PROCEDURE — 87880 STREP A ASSAY W/OPTIC: CPT | Performed by: NURSE PRACTITIONER

## 2021-08-11 NOTE — PROGRESS NOTES
CHIEF COMPLAINT:   Patient presents with:  Sore Throat: Entered by patient;       HPI:   Max Byrd is a 46year old female who presents with symptoms as described below for 6 days.        • Fever:     Yes []     No [x]       • Cough:    Yes []     No [x] Tab Take 1 tablet (50 mg total) by mouth every 2 (two) hours as needed for Migraine. DO NOT TAKE MORE TAKE 2 PILLS A DAY 10 tablet 3   • Vilazodone HCl 40 MG Oral Tab Viibryd 40 mg tablet    1 tablet every day by oral route.      • Albuterol Sulfate HFA (VE HPI    EXAM:   /80   Pulse 85   Temp 98.8 °F (37.1 °C) (Tympanic)   Resp 16   Ht 5' 1\" (1.549 m)   Wt 180 lb (81.6 kg)   LMP 08/11/2021 (Exact Date)   SpO2 98%   Breastfeeding No   BMI 34.01 kg/m²   GENERAL: appears well, well nourished, in no appar covid hotline if any questions or concerns that may arise. Discussed length of time to obtain results as well as obtaining results on mychart. Encouraged mychart sign up if not already completed. CDC quarantine recommendations reviewed.      Notif

## 2021-08-11 NOTE — PATIENT INSTRUCTIONS
Viral Upper Respiratory Illness (Adult)    You have a viral upper respiratory illness (URI), which is another term for the common cold. This illness is contagious during the first few days. It is spread through the air by coughing and sneezing.  It may al decongestants if you have high blood pressure.)  Follow-up care  Follow up with your healthcare provider, or as advised.   When to seek medical advice  Call your healthcare provider right away if any of these occur:  · Cough with lots of colored sputum (muc cause side effects. These include drowsiness and drying of the eyes, nose, and mouth. Soothe a sore throat and cough  · Gargle every  2 hours with  1/4 teaspoon of salt dissolved in  1/2 cup of warm water.  Suck on throat lozenges and cough drops to [employer] All rights reserved. This information is not intended as a substitute for professional medical care. Always follow your healthcare professional's instructions.

## 2021-08-12 LAB — SARS-COV-2 RNA RESP QL NAA+PROBE: NOT DETECTED

## 2021-08-13 NOTE — TELEPHONE ENCOUNTER
Left message to call back and schedule appointment. Call Center if patient returns call, please schedule Office visit with Dionna Jaramillo. Letter was sent out to patient.

## 2021-08-25 RX ORDER — BACLOFEN 5 MG/1
5 TABLET ORAL 2 TIMES DAILY
Qty: 60 TABLET | Refills: 0 | Status: SHIPPED | OUTPATIENT
Start: 2021-08-25 | End: 2021-09-24

## 2021-08-25 NOTE — TELEPHONE ENCOUNTER
Requested Prescriptions     Pending Prescriptions Disp Refills   • BACLOFEN 5 MG Oral Tab [Pharmacy Med Name: BACLOFEN 5 MG TABLET] 60 tablet 0     Sig: TAKE 1 TABLET (5 MG TOTAL) BY MOUTH 2 (TWO) TIMES DAILY.  OR CAN TAKE 2 TABLETS AT NIGHT AS NEEDED     L

## 2021-09-28 RX ORDER — SECUKINUMAB 150 MG/ML
INJECTION SUBCUTANEOUS
Qty: 1.96 ML | Refills: 4 | Status: SHIPPED | OUTPATIENT
Start: 2021-09-28

## 2021-09-28 NOTE — TELEPHONE ENCOUNTER
Requested Prescriptions     Pending Prescriptions Disp Refills   • COSENTYX SENSOREADY, 300 MG, 150 MG/ML Subcutaneous Solution Auto-injector [Pharmacy Med Name: Maria A Rise PEN (2/BOX) 150MG/ML] 1.96 mL 4     Sig: INJECT TWO PENS SUBCUTANEOUSLY EVERY 28 DAYS - 56 U/L 27   AST (SGOT)      15 - 37 U/L 13 (L)   ALKALINE PHOSPHATASE      37 - 98 U/L 86   Total Bilirubin      0.1 - 2.0 mg/dL 0.4   TOTAL PROTEIN      6.4 - 8.2 g/dL 7.5   Albumin      3.4 - 5.0 g/dL 3.7   Globulin      2.8 - 4.4 g/dL 3.8   A/G Ratio

## 2021-09-30 NOTE — TELEPHONE ENCOUNTER
Requested Prescriptions     Pending Prescriptions Disp Refills   • DEXILANT 60 MG Oral Capsule Delayed Release [Pharmacy Med Name: [address] [place] [address] CAP 60MG DR] 90 capsule 0     Sig: TAKE 1 CAPSULE DAILY     LOV: 12/29/2020  Last Refill: 08/02/2021    Routed to On

## 2021-10-04 NOTE — TELEPHONE ENCOUNTER
Called and left a VM to patient, inform per Dr. Bob Cotton she is Due for follow up -  Please call office to help schedule and appointment  for November that will be 6 months. CSS, please assist patient with appointment.

## 2021-10-06 ENCOUNTER — LAB ENCOUNTER (OUTPATIENT)
Dept: LAB | Facility: HOSPITAL | Age: 41
End: 2021-10-06
Attending: INTERNAL MEDICINE
Payer: COMMERCIAL

## 2021-10-06 DIAGNOSIS — E55.9 VITAMIN D DEFICIENCY: ICD-10-CM

## 2021-10-06 DIAGNOSIS — Z51.81 THERAPEUTIC DRUG MONITORING: ICD-10-CM

## 2021-10-06 DIAGNOSIS — R53.83 FATIGUE, UNSPECIFIED TYPE: ICD-10-CM

## 2021-10-06 DIAGNOSIS — M45.9 ANKYLOSING SPONDYLITIS, UNSPECIFIED SITE OF SPINE (HCC): ICD-10-CM

## 2021-10-06 PROCEDURE — 84450 TRANSFERASE (AST) (SGOT): CPT

## 2021-10-06 PROCEDURE — 86140 C-REACTIVE PROTEIN: CPT

## 2021-10-06 PROCEDURE — 84443 ASSAY THYROID STIM HORMONE: CPT

## 2021-10-06 PROCEDURE — 85027 COMPLETE CBC AUTOMATED: CPT

## 2021-10-06 PROCEDURE — 82565 ASSAY OF CREATININE: CPT

## 2021-10-06 PROCEDURE — 36415 COLL VENOUS BLD VENIPUNCTURE: CPT

## 2021-10-06 PROCEDURE — 84439 ASSAY OF FREE THYROXINE: CPT

## 2021-10-06 PROCEDURE — 82306 VITAMIN D 25 HYDROXY: CPT

## 2021-10-06 PROCEDURE — 84480 ASSAY TRIIODOTHYRONINE (T3): CPT

## 2021-10-06 PROCEDURE — 85652 RBC SED RATE AUTOMATED: CPT

## 2021-10-06 PROCEDURE — 84460 ALANINE AMINO (ALT) (SGPT): CPT

## 2021-10-07 ENCOUNTER — TELEPHONE (OUTPATIENT)
Dept: RHEUMATOLOGY | Facility: CLINIC | Age: 41
End: 2021-10-07

## 2021-10-07 RX ORDER — ERGOCALCIFEROL (VITAMIN D2) 1250 MCG
50000 CAPSULE ORAL WEEKLY
Qty: 12 CAPSULE | Refills: 0 | Status: SHIPPED | OUTPATIENT
Start: 2021-10-07 | End: 2022-01-05

## 2021-11-04 ENCOUNTER — OFFICE VISIT (OUTPATIENT)
Dept: FAMILY MEDICINE CLINIC | Facility: CLINIC | Age: 41
End: 2021-11-04
Payer: COMMERCIAL

## 2021-11-04 VITALS
TEMPERATURE: 99 F | BODY MASS INDEX: 33.99 KG/M2 | WEIGHT: 180 LBS | RESPIRATION RATE: 18 BRPM | SYSTOLIC BLOOD PRESSURE: 129 MMHG | HEART RATE: 83 BPM | DIASTOLIC BLOOD PRESSURE: 73 MMHG | HEIGHT: 61 IN | OXYGEN SATURATION: 100 %

## 2021-11-04 DIAGNOSIS — J02.9 SORE THROAT: ICD-10-CM

## 2021-11-04 DIAGNOSIS — J02.0 STREP THROAT: Primary | ICD-10-CM

## 2021-11-04 PROCEDURE — 3078F DIAST BP <80 MM HG: CPT | Performed by: NURSE PRACTITIONER

## 2021-11-04 PROCEDURE — 3008F BODY MASS INDEX DOCD: CPT | Performed by: NURSE PRACTITIONER

## 2021-11-04 PROCEDURE — 99213 OFFICE O/P EST LOW 20 MIN: CPT | Performed by: NURSE PRACTITIONER

## 2021-11-04 PROCEDURE — 3074F SYST BP LT 130 MM HG: CPT | Performed by: NURSE PRACTITIONER

## 2021-11-04 PROCEDURE — 87880 STREP A ASSAY W/OPTIC: CPT | Performed by: NURSE PRACTITIONER

## 2021-11-04 RX ORDER — AZITHROMYCIN 250 MG/1
TABLET, FILM COATED ORAL
Qty: 6 TABLET | Refills: 0 | Status: SHIPPED | OUTPATIENT
Start: 2021-11-04 | End: 2021-11-09

## 2021-11-04 NOTE — PATIENT INSTRUCTIONS
· You are considered to be contagious until you have been on antibiotics for 24 hours. · You can return to school and/or work once on antibiotics for 24 hours.    · Can use over the counter Tylenol/Ibuprofen as needed and directed on the packing for pain/ to school or work if you are feeling better, have been taking the antibiotic for at least 24 hours and don't have a fever.   · Take antibiotic medicine for the full 10 days, even if you feel better.  This is very important to ensure the infection is treated 20 seconds. · Don’t have close contact with people who have sore throats, colds, or other upper respiratory infections. · Don’t smoke, and stay away from secondhand smoke.   Young last reviewed this educational content on 3/1/2020  © 0444-2059 The Stay pharmacist if you are pregnant, planning to be pregnant, or breastfeeding. Ask your pharmacist if this medicine can interact with any of your other medicines. Be sure to tell them about all the medicines you take.   Please tell all your doctors and dentist other documents about your medicine. Please talk to them if you have any questions. Always follow their advice. There is a more complete description of this medicine available in [place]. Scan this code on your smartphone or tablet or use the web address bel

## 2021-11-04 NOTE — PROGRESS NOTES
CHIEF COMPLAINT:   Patient presents with:  Sore Throat: Entered by patient      HPI:   Max Byrd is a 46year old female who presents with symptoms as described below for 1 days.        • Fever:     Yes []     No [x]       • Cough:    Yes [x]     No [] ADMINISTRATION. 1.96 mL 4   • tretinoin 0.025 % External Cream Apply topically nightly. • Vilazodone HCl 40 MG Oral Tab Viibryd 40 mg tablet    1 tablet every day by oral route.      • Albuterol Sulfate HFA (VENTOLIN HFA) 108 (90 Base) MCG/ACT Inhalatio rash or other lesions  HEENT: See HPI  LUNGS: See HPI  CARDIOVASCULAR: denies palpitations; see HPI  GI: see HPI  NEURO: Denies dizziness; see HPI    EXAM:   /73   Pulse 83   Temp 98.6 °F (37 °C) (Tympanic)   Resp 18   Ht 5' 1\" (1.549 m)   Wt 180 lb daily for 1 day, THEN 1 tablet (250 mg total) daily for 4 days. PLAN:      Strep is positive. Will treat with azithromycin due to PCN allergy. Warm salt water gargles TID. Change toothbrush in 2 days.  Considered infectious X 24hrs after starting lozenges with Benzocaine 15 mg and Menthol 3.6 mg are can help numb your sore throat temporarily in adults and older children  · Follow up in 2-3 days with your PCP or in our clinic if not improving, or if your fever is greater than or equal to 100.4F for if you have chronic liver or kidney disease or if you have had a stomach ulcer or gastrointestinal bleeding. · Throat lozenges or sprays help reduce pain. Gargling with warm saltwater will also reduce throat pain.  Dissolve 1/2 teaspoon of salt in 1 glass food to prevent stomach upset. Keep the medicine at room temperature. Avoid heat and direct light. Do not take this medicine with antacids. It is important that you keep taking each dose of this medicine on time even if you are feeling well.   If you for doctor about what you should do if you experience these or other side effects.   · diarrhea  · nausea  · stomach upset or abdominal pain  · yeast infection of mouth  · vaginal itching or yeast infection  · vomiting  Call your doctor or get medical help tam

## 2021-11-07 ENCOUNTER — TELEPHONE (OUTPATIENT)
Dept: FAMILY MEDICINE CLINIC | Facility: CLINIC | Age: 41
End: 2021-11-07

## 2021-11-07 ENCOUNTER — OFFICE VISIT (OUTPATIENT)
Dept: FAMILY MEDICINE CLINIC | Facility: CLINIC | Age: 41
End: 2021-11-07
Payer: COMMERCIAL

## 2021-11-07 DIAGNOSIS — J02.0 STREP PHARYNGITIS: Primary | ICD-10-CM

## 2021-11-07 RX ORDER — CEFDINIR 300 MG/1
300 CAPSULE ORAL 2 TIMES DAILY
Qty: 20 CAPSULE | Refills: 0 | Status: SHIPPED | OUTPATIENT
Start: 2021-11-07 | End: 2021-11-17

## 2021-11-07 NOTE — TELEPHONE ENCOUNTER
Pt was seen recently, on 11/4/21 and started on Azithromycin for strep A. Pt is not improving as expected and had been instructed to call back. Pt is able to talk and swallow and a bit warm or maybe feverish today. Requesting a change in antibiotics.  Coby

## 2021-11-16 ENCOUNTER — OFFICE VISIT (OUTPATIENT)
Dept: FAMILY MEDICINE CLINIC | Facility: CLINIC | Age: 41
End: 2021-11-16
Payer: COMMERCIAL

## 2021-11-16 VITALS
DIASTOLIC BLOOD PRESSURE: 66 MMHG | TEMPERATURE: 98 F | HEART RATE: 74 BPM | SYSTOLIC BLOOD PRESSURE: 130 MMHG | RESPIRATION RATE: 16 BRPM | HEIGHT: 61 IN | WEIGHT: 175 LBS | BODY MASS INDEX: 33.04 KG/M2 | OXYGEN SATURATION: 96 %

## 2021-11-16 DIAGNOSIS — J02.9 PHARYNGITIS, UNSPECIFIED ETIOLOGY: ICD-10-CM

## 2021-11-16 DIAGNOSIS — J02.0 STREP THROAT: Primary | ICD-10-CM

## 2021-11-16 PROCEDURE — 3078F DIAST BP <80 MM HG: CPT | Performed by: NURSE PRACTITIONER

## 2021-11-16 PROCEDURE — 99213 OFFICE O/P EST LOW 20 MIN: CPT | Performed by: NURSE PRACTITIONER

## 2021-11-16 PROCEDURE — 87147 CULTURE TYPE IMMUNOLOGIC: CPT | Performed by: NURSE PRACTITIONER

## 2021-11-16 PROCEDURE — 3008F BODY MASS INDEX DOCD: CPT | Performed by: NURSE PRACTITIONER

## 2021-11-16 PROCEDURE — 3075F SYST BP GE 130 - 139MM HG: CPT | Performed by: NURSE PRACTITIONER

## 2021-11-16 PROCEDURE — 87081 CULTURE SCREEN ONLY: CPT | Performed by: NURSE PRACTITIONER

## 2021-11-16 PROCEDURE — 87880 STREP A ASSAY W/OPTIC: CPT | Performed by: NURSE PRACTITIONER

## 2021-11-16 NOTE — PROGRESS NOTES
CHIEF COMPLAINT:   Patient presents with:  Sore Throat: Entered by patient      HPI:   Eric Swartz is a 46year old female presents to clinic with symptoms of sore throat. Patient has had for 12 days.  PT was seen on 11/4/21 given azithromycin for positive topically nightly. • SUMAtriptan Succinate 50 MG Oral Tab Take 1 tablet (50 mg total) by mouth every 2 (two) hours as needed for Migraine.  DO NOT TAKE MORE TAKE 2 PILLS A DAY (Patient not taking: Reported on 11/4/2021) 10 tablet 3   • Albuterol Sulfate apparent distress  SKIN: no rashes,no suspicious lesions  HEAD: atraumatic, normocephalic  EYES: conjunctiva clear, EOM intact  EARS: TM's clear, non-injected, no bulging, retraction, or fluid  NOSE: nostrils patent, no exudates, nasal mucosa pink and raul soothing for patient    Warm salt water gargles 2 times per day for at least 3 days. Do not share utensils or drinks with anyone.     Discussed s/s of worsening infection/condition with Patient and importance of prompt medical re-evaluation including w gargling with 1 of these solutions:   · 1/4 teaspoon of salt in 1/2 cup of warm water  · An over-the-counter anesthetic gargle  Use medicine for more relief  Over-the-counter medicine can reduce sore throat symptoms.  Ask your pharmacist if you have questio intended as a substitute for professional medical care. Always follow your healthcare professional's instructions. Pharyngitis: Strep (Confirmed)    You have had a positive test for strep throat. Strep throat is a contagious illness.  It's spread by get medical advice  Call your healthcare provider right away or get immediate medical care if any of these occur:   · Fever of 100.4ºF (38ºC) or higher, or as directed by your healthcare provider  · New or worsening ear pain, sinus pain, or headache  · [employer]

## 2021-11-16 NOTE — PATIENT INSTRUCTIONS
Self-Care for Sore Throats  Sore throats happen for many reasons, such as colds, allergies, cigarette smoke, air pollution, and infections caused by viruses or bacteria. In any case, your throat becomes red and sore.  Your goal for self-care is to reduce allergy-causing substances, such as pollen and mold. · Wash your hands often when you’re around someone with a sore throat or cold. This will keep viruses or bacteria from spreading. · Limit outdoor time when air pollution is bad.   · Don’t strain your vo fever.   · Take antibiotic medicine for the full 10 days, even if you feel better.  This is very important to ensure the infection is treated completely. It's also important to prevent medicine-resistant germs from developing. If you were given an antibioti infections. · Don’t smoke, and stay away from secondhand smoke. Young last reviewed this educational content on 3/1/2020  © 4580-4836 The [employer]. All rights reserved.  This information is not intended as a substitute for professional med

## 2021-11-18 ENCOUNTER — TELEPHONE (OUTPATIENT)
Dept: FAMILY MEDICINE CLINIC | Facility: CLINIC | Age: 41
End: 2021-11-18

## 2021-11-18 RX ORDER — CLINDAMYCIN HYDROCHLORIDE 300 MG/1
300 CAPSULE ORAL 3 TIMES DAILY
Qty: 30 CAPSULE | Refills: 0 | Status: SHIPPED | OUTPATIENT
Start: 2021-11-18 | End: 2021-11-28

## 2021-11-29 ENCOUNTER — TELEPHONE (OUTPATIENT)
Dept: INTERNAL MEDICINE CLINIC | Facility: CLINIC | Age: 41
End: 2021-11-29

## 2021-11-29 NOTE — TELEPHONE ENCOUNTER
Pt has been seen in the [facility] twice starting at the beginning of this month.  She is positive for Strep B both times, she has been on two different antibiotics and when they tried a third she said it felt like a hole burning through her esophagus so they told

## 2021-12-06 ENCOUNTER — TELEPHONE (OUTPATIENT)
Dept: RHEUMATOLOGY | Facility: CLINIC | Age: 41
End: 2021-12-06

## 2021-12-08 ENCOUNTER — TELEPHONE (OUTPATIENT)
Dept: RHEUMATOLOGY | Facility: CLINIC | Age: 41
End: 2021-12-08

## 2021-12-08 ENCOUNTER — OFFICE VISIT (OUTPATIENT)
Dept: RHEUMATOLOGY | Facility: CLINIC | Age: 41
End: 2021-12-08
Payer: COMMERCIAL

## 2021-12-08 VITALS
WEIGHT: 181 LBS | RESPIRATION RATE: 16 BRPM | DIASTOLIC BLOOD PRESSURE: 66 MMHG | SYSTOLIC BLOOD PRESSURE: 102 MMHG | HEIGHT: 61 IN | HEART RATE: 96 BPM | BODY MASS INDEX: 34.17 KG/M2

## 2021-12-08 DIAGNOSIS — M45.9 ANKYLOSING SPONDYLITIS, UNSPECIFIED SITE OF SPINE (HCC): Primary | ICD-10-CM

## 2021-12-08 DIAGNOSIS — Z51.81 THERAPEUTIC DRUG MONITORING: ICD-10-CM

## 2021-12-08 PROCEDURE — 99214 OFFICE O/P EST MOD 30 MIN: CPT | Performed by: INTERNAL MEDICINE

## 2021-12-08 PROCEDURE — 3074F SYST BP LT 130 MM HG: CPT | Performed by: INTERNAL MEDICINE

## 2021-12-08 PROCEDURE — 3008F BODY MASS INDEX DOCD: CPT | Performed by: INTERNAL MEDICINE

## 2021-12-08 PROCEDURE — 3078F DIAST BP <80 MM HG: CPT | Performed by: INTERNAL MEDICINE

## 2021-12-08 RX ORDER — DAPSONE 50 MG/G
GEL TOPICAL
COMMUNITY
Start: 2021-12-02

## 2021-12-08 RX ORDER — TRAZODONE HYDROCHLORIDE 50 MG/1
TABLET ORAL
COMMUNITY
Start: 2021-12-04

## 2021-12-08 RX ORDER — CLOBETASOL PROPIONATE 0.5 MG/G
OINTMENT TOPICAL
COMMUNITY
Start: 2021-10-11

## 2021-12-08 NOTE — TELEPHONE ENCOUNTER
Pt. Is not better on cosentyx 300mg a month - will switch to taltz 160mg once ,t hen 80mg every 4 weeks -   Will need PA

## 2021-12-08 NOTE — PATIENT INSTRUCTIONS
1. Start Taltz 160mg once , then 80mg every 4 weeks. 2. Plan to stop   consentyx ( 300mg a month  Due to bad infection - and only modest improvement )  3. Ibuprofen is ok   4. Return to clinic in 3 months   5. Check labs in today , then in  months.    Meaghan Morgan of the injection each time. Choose a location at least 1 inch from the last injection. Do not rub or massage the area where the injection was given. It is important that you keep taking each dose of this medicine on time even if you are feeling well.   If colds, flu or other infections. Contact your doctor if you develop any signs of a new infection such as fever, cough, sore throat, or chills. Wash your hands often and avoid close contact with people with infections such as colds and flu.   Speak with you diarrhea  · fainting  · fever or chills  · flu-like symptoms  · shortness of breath  · severe stomach or bowel pain  A few people may have an allergic reactions to this medicine.  Symptoms can include difficulty breathing, skin rash, itching, swelling, or s

## 2021-12-08 NOTE — PROGRESS NOTES
Lyle Alejandro is a 46year old female who presents for Patient presents with: Ankylosing Spondylitis  Lab Results  Medication Follow-Up  Neck Pain: lower  Shoulder Pain: Left  Back Pain: low  .    HPI:     I had the pleasure of seeing Lyle Alejandro on 5/ treatment and that flared her back. Her feet and ankles and shoulders are hurting. She was told it might be tendonitis. She's in PT for left shoulder right now for rotator cuff tendonitis. shes' taking aleve prn. It takes the edge off.      No hx of p scared to be immunosuppressed. She was doing better on cosentyx overall. She felt she was pretty maintained. Since stopping it, she has been having more lower back pain - lumbar area and si joints.    She has been going for walks with her daughter and i joints and ligaments and left her non functional.   She is fully vaccinated covid in January and February -   She hasn't felt as well as she was in the past with coxentyx.    She feels that through the pandemic she was sedentary and dind't notice any change TAKE 1 CAPSULE DAILY 90 capsule 0   • COSENTYX SENSOREADY, 300 MG, 150 MG/ML Subcutaneous Solution Auto-injector INJECT TWO PENS SUBCUTANEOUSLY EVERY 28 DAYS. REFRIGERATE. ALLOW 15 TO 30 MINUTES AT ROOM TEMP PRIOR TO ADMINISTRATION.  1.96 mL 4   • tretinoin Problem Relation Age of Onset   • Lipids Father         Hyperlipidemia   • Hypertension Father    • Heart Disease Father         CAD   • Lipids Mother         Hyperlipidemia   • Hypertension Mother    • Depression Mother    • Other (Other) Mother spent 1 week in the NICU - had meconium,   Had IUD - off of this now,   Neuro: gets occl  numbness or tingling down her left side, during pregnancy - sciatica, chronic headaches/occl migraines - 3-4 times a year, even before daughter was born,  - , no hx o %      % 6.0   Eosinophils %      % 1.1   Basophils %      % 0.5   Immature Granulocyte %      % 0.5   CREATININE      0.55 - 1.02 mg/dL 0.93   eGFR NON-AFR.  AMERICAN      >=60 79   eGFR AFRICAN AMERICAN      >=60 91   SED RATE      0 - 20 mm/Hr 13   [place] extremity   temperature was maintained at 300C or higher. 2. The results of the nerve conduction studies are within normal   limits. EMG Needle Study:     1.  EMG studies were performed of the left upper and lower   extremities with concentric needle el cosentyx but she has bad reflux - so hold for now   Per pt. She was also on humira in the past in 2017 and this didn't work -with dr. Sharda Quispe -   - check yearly quantiferon gold . 12/2020 -   - rtc in 6months     2.  Fibromyalgia overlapping - with paracervic

## 2021-12-15 NOTE — TELEPHONE ENCOUNTER
Fax received fro [employer]  Taltz 80mg denied  Self-administered preferred meds are Cosentyx, Enbrel and Humira for Ankylosing Spondylitis. Must have tried all those and failed prior to getting approval for Taltz.      Chart notes for Humira (didn't work)

## 2021-12-16 ENCOUNTER — OFFICE VISIT (OUTPATIENT)
Dept: FAMILY MEDICINE CLINIC | Facility: CLINIC | Age: 41
End: 2021-12-16
Payer: COMMERCIAL

## 2021-12-16 ENCOUNTER — TELEPHONE (OUTPATIENT)
Dept: RHEUMATOLOGY | Facility: CLINIC | Age: 41
End: 2021-12-16

## 2021-12-16 VITALS
WEIGHT: 181 LBS | OXYGEN SATURATION: 98 % | BODY MASS INDEX: 34.17 KG/M2 | HEART RATE: 70 BPM | HEIGHT: 61 IN | SYSTOLIC BLOOD PRESSURE: 115 MMHG | DIASTOLIC BLOOD PRESSURE: 70 MMHG | RESPIRATION RATE: 14 BRPM | TEMPERATURE: 100 F

## 2021-12-16 DIAGNOSIS — Z87.09 HISTORY OF STREP SORE THROAT: ICD-10-CM

## 2021-12-16 DIAGNOSIS — R10.9 FLANK PAIN: ICD-10-CM

## 2021-12-16 DIAGNOSIS — J02.9 SORE THROAT: ICD-10-CM

## 2021-12-16 DIAGNOSIS — R19.7 DIARRHEA, UNSPECIFIED TYPE: Primary | ICD-10-CM

## 2021-12-16 PROCEDURE — 87081 CULTURE SCREEN ONLY: CPT | Performed by: NURSE PRACTITIONER

## 2021-12-16 PROCEDURE — 3008F BODY MASS INDEX DOCD: CPT | Performed by: NURSE PRACTITIONER

## 2021-12-16 PROCEDURE — 3074F SYST BP LT 130 MM HG: CPT | Performed by: NURSE PRACTITIONER

## 2021-12-16 PROCEDURE — U0002 COVID-19 LAB TEST NON-CDC: HCPCS | Performed by: NURSE PRACTITIONER

## 2021-12-16 PROCEDURE — 3078F DIAST BP <80 MM HG: CPT | Performed by: NURSE PRACTITIONER

## 2021-12-16 PROCEDURE — 99213 OFFICE O/P EST LOW 20 MIN: CPT | Performed by: NURSE PRACTITIONER

## 2021-12-16 PROCEDURE — 81003 URINALYSIS AUTO W/O SCOPE: CPT | Performed by: NURSE PRACTITIONER

## 2021-12-16 NOTE — TELEPHONE ENCOUNTER
\"DBVu cancel PA. Called [employer] and they will fax us PA forms for this patient. \" 12/08/2021    \"Patient is willing to try Enbrel. PA started via [employer]. Await forms. \" 12/08/2021

## 2021-12-16 NOTE — TELEPHONE ENCOUNTER
Bhumika cancel PA. Called [facility] and they will fax us PA forms for this patient.

## 2021-12-16 NOTE — PATIENT INSTRUCTIONS
Diarrhea with Uncertain Cause (Adult)    Diarrhea is when stools are loose and watery.  This can be caused by:  · Viral infections  · Bacterial infections  · Food poisoning  · Parasites  · Irritable bowel syndrome (IBS)  · Inflammatory bowel diseases such disease, or ever had a stomach ulcer or gastrointestinal bleeding. Don't use NSAID medicines if you are already taking one for another condition (like arthritis) or are on daily aspirin therapy (such as for heart disease or after a stroke).  Talk with your or drink milk if you have diarrhea. These can make diarrhea worse. During the first 24 hours you can try:  · Oral rehydration solutions.  Sports drinks may be used if you are not too dehydrated and are otherwise healthy.   · Soft drinks without caffeine  · weakness, or dizziness  · Drowsiness  · New rash  · You don’t get better in 2 to 3 days  · Fever of 100.4°F (38°C) or higher, or as directed by your healthcare provider  Young last reviewed this educational content on 6/1/2018  © 0165-9658 The Young about new therapies that may be available to treat the infection and lower the chance of relapse. Take antibiotics only when you really need them. Antibiotics don't help treat illnesses caused by viruses, such as colds and the flu.  Don't ask for antibiot

## 2021-12-16 NOTE — PROGRESS NOTES
Vomited x 2  Diarrhea   Anxiety       CHIEF COMPLAINT:   Patient presents with:  Sore Throat: Sore Throat, Diarrhea, Vomiting - Entered by patient      HPI:   Fabiola Subramanian is a 46year old female who presents for complaints of diarrhea for 3 days.   Reports 300 MG, 150 MG/ML Subcutaneous Solution Auto-injector INJECT TWO PENS SUBCUTANEOUSLY EVERY 28 DAYS. REFRIGERATE. ALLOW 15 TO 30 MINUTES AT ROOM TEMP PRIOR TO ADMINISTRATION. 1.96 mL 4   • tretinoin 0.025 % External Cream Apply topically nightly.      • AUSTEN OF SYSTEMS:   GENERAL HEALTH: See HPI  SKIN: denies any unusual skin lesions or rashes  HEENT: denies ear pain, congestion, or sore throat  CARDIOVASCULAR: denies chest pain or palpitations  RESPIRATORY: denies shortness of breath, cough or wheezing  GI:Se Patient Instructions     Diarrhea with Uncertain Cause (Adult)    Diarrhea is when stools are loose and watery.  This can be caused by:  · Viral infections  · Bacterial infections  · Food poisoning  · Parasites  · Irritable bowel syndrome (IBS)  · I have chronic liver or kidney disease, or ever had a stomach ulcer or gastrointestinal bleeding.  Don't use NSAID medicines if you are already taking one for another condition (like arthritis) or are on daily aspirin therapy (such as for heart disease or aft foods.  · Don’t eat dairy foods or drink milk if you have diarrhea. These can make diarrhea worse. During the first 24 hours you can try:  · Oral rehydration solutions.  Sports drinks may be used if you are not too dehydrated and are otherwise healthy.   · dry mouth and tongue, tiredness, weakness, or dizziness  · Drowsiness  · New rash  · You don’t get better in 2 to 3 days  · Fever of 100.4°F (38°C) or higher, or as directed by your healthcare provider  Young last reviewed this educational content on 6/ comes back after treatment, ask about new therapies that may be available to treat the infection and lower the chance of relapse. Take antibiotics only when you really need them.  Antibiotics don't help treat illnesses caused by viruses, such as colds and

## 2021-12-23 NOTE — TELEPHONE ENCOUNTER
Left message to check [employer] to call us with any questions.      Fax received from [facility] for Enbrel 89JM/RS [employer] approved  75/57/80 - 12/21/22  [facility] # [id number]

## 2022-01-10 NOTE — TELEPHONE ENCOUNTER
Left message to call back. hospitals Staff--    Please assist with scheduling follow up with flaquito Irene next available. Thank you!

## 2022-01-10 NOTE — TELEPHONE ENCOUNTER
Nursing: We should contact this patient to arrange for follow-up. It appears per prior telephone encounters, that we have previously done so. I do not see an appointment scheduled. Has been >1-year. I can refill until the time of the next appointment.

## 2022-01-13 RX ORDER — ERGOCALCIFEROL 1.25 MG/1
CAPSULE ORAL
Qty: 12 CAPSULE | Refills: 0 | OUTPATIENT
Start: 2022-01-13

## 2022-01-13 NOTE — TELEPHONE ENCOUNTER
LOV: 12/08/21  No future appointments.    LABS:  Component      Latest Ref Rng & Units 10/6/2021   WBC      4.0 - 11.0 x10(3) uL 7.0   RBC      3.80 - 5.30 x10(6)uL 4.59   Hemoglobin      12.0 - 16.0 g/dL 13.2   Hematocrit      35.0 - 48.0 % 39.9   MCV

## 2022-01-18 ENCOUNTER — LAB ENCOUNTER (OUTPATIENT)
Dept: LAB | Facility: HOSPITAL | Age: 42
End: 2022-01-18
Attending: INTERNAL MEDICINE
Payer: COMMERCIAL

## 2022-01-18 DIAGNOSIS — Z51.81 THERAPEUTIC DRUG MONITORING: ICD-10-CM

## 2022-01-18 DIAGNOSIS — M45.9 ANKYLOSING SPONDYLITIS, UNSPECIFIED SITE OF SPINE (HCC): ICD-10-CM

## 2022-01-18 LAB
CRP SERPL-MCNC: 0.52 MG/DL (ref ?–0.3)
ERYTHROCYTE [SEDIMENTATION RATE] IN BLOOD: 16 MM/HR

## 2022-01-18 PROCEDURE — 86140 C-REACTIVE PROTEIN: CPT

## 2022-01-18 PROCEDURE — 85652 RBC SED RATE AUTOMATED: CPT

## 2022-01-18 PROCEDURE — 86480 TB TEST CELL IMMUN MEASURE: CPT

## 2022-01-18 PROCEDURE — 36415 COLL VENOUS BLD VENIPUNCTURE: CPT

## 2022-01-20 LAB
M TB IFN-G CD4+ T-CELLS BLD-ACNC: 0 IU/ML
M TB TUBERC IFN-G BLD QL: NEGATIVE
M TB TUBERC IGNF/MITOGEN IGNF CONTROL: >10 IU/ML
QFT TB1 AG MINUS NIL: 0 IU/ML
QFT TB2 AG MINUS NIL: 0.02 IU/ML

## 2022-01-31 NOTE — TELEPHONE ENCOUNTER
India Mccoy from [employer] called in to follow up on the refill request. Please follow up

## 2022-02-02 NOTE — TELEPHONE ENCOUNTER
Left message to call back and schedule appointment. Call Center if patient returns call, please schedule Office visit with Yuliya. Thank you !

## 2022-02-07 NOTE — TELEPHONE ENCOUNTER
Left message to call back and schedule appointment. Call Center if patient returns call, please schedule Office visit with Aaliyah Mtz. Thank you !

## 2022-02-10 NOTE — TELEPHONE ENCOUNTER
Patient has scheduled OV with Cayla Smallwood on 3/23/2022. Please approve rx if appropriate. Thank you! Routed to Physician office on call.

## 2022-02-23 ENCOUNTER — HOSPITAL ENCOUNTER (OUTPATIENT)
Age: 42
Discharge: HOME | End: 2022-02-23
Payer: COMMERCIAL

## 2022-02-23 VITALS
TEMPERATURE: 98 F | HEART RATE: 77 BPM | SYSTOLIC BLOOD PRESSURE: 136 MMHG | RESPIRATION RATE: 16 BRPM | OXYGEN SATURATION: 97 % | DIASTOLIC BLOOD PRESSURE: 73 MMHG

## 2022-02-23 DIAGNOSIS — Z20.822 ENCOUNTER FOR LABORATORY TESTING FOR COVID-19 VIRUS: Primary | ICD-10-CM

## 2022-02-23 DIAGNOSIS — B34.9 VIRAL ILLNESS: ICD-10-CM

## 2022-02-23 LAB
S PYO AG THROAT QL: NEGATIVE
SARS-COV-2 RNA RESP QL NAA+PROBE: NOT DETECTED

## 2022-02-23 PROCEDURE — 87880 STREP A ASSAY W/OPTIC: CPT | Performed by: NURSE PRACTITIONER

## 2022-02-23 PROCEDURE — U0002 COVID-19 LAB TEST NON-CDC: HCPCS | Performed by: NURSE PRACTITIONER

## 2022-02-23 PROCEDURE — 99213 OFFICE O/P EST LOW 20 MIN: CPT | Performed by: NURSE PRACTITIONER

## 2022-03-23 ENCOUNTER — OFFICE VISIT (OUTPATIENT)
Dept: GASTROENTEROLOGY | Facility: CLINIC | Age: 42
End: 2022-03-23
Payer: COMMERCIAL

## 2022-03-23 VITALS
SYSTOLIC BLOOD PRESSURE: 135 MMHG | DIASTOLIC BLOOD PRESSURE: 81 MMHG | HEART RATE: 97 BPM | WEIGHT: 187 LBS | BODY MASS INDEX: 35 KG/M2 | TEMPERATURE: 99 F

## 2022-03-23 DIAGNOSIS — K44.9 HIATAL HERNIA WITH GERD: Primary | ICD-10-CM

## 2022-03-23 DIAGNOSIS — R19.4 ALTERED BOWEL HABITS: ICD-10-CM

## 2022-03-23 DIAGNOSIS — K21.9 HIATAL HERNIA WITH GERD: Primary | ICD-10-CM

## 2022-03-23 PROCEDURE — 99214 OFFICE O/P EST MOD 30 MIN: CPT | Performed by: NURSE PRACTITIONER

## 2022-03-23 PROCEDURE — 3079F DIAST BP 80-89 MM HG: CPT | Performed by: NURSE PRACTITIONER

## 2022-03-23 PROCEDURE — 3075F SYST BP GE 130 - 139MM HG: CPT | Performed by: NURSE PRACTITIONER

## 2022-03-23 RX ORDER — DEXLANSOPRAZOLE 60 MG/1
60 CAPSULE, DELAYED RELEASE ORAL DAILY
Qty: 90 CAPSULE | Refills: 1 | Status: SHIPPED | OUTPATIENT
Start: 2022-03-23 | End: 2022-03-29 | Stop reason: ALTCHOICE

## 2022-03-23 NOTE — PATIENT INSTRUCTIONS
-Start Perla Stapler  -If Perla Stapler is not authorized, will start pantoprazole 40 mg twice daily  -Follow-up with new or ongoing GI issues

## 2022-03-25 ENCOUNTER — TELEPHONE (OUTPATIENT)
Dept: GASTROENTEROLOGY | Facility: CLINIC | Age: 42
End: 2022-03-25

## 2022-03-29 RX ORDER — PANTOPRAZOLE SODIUM 40 MG/1
40 TABLET, DELAYED RELEASE ORAL 2 TIMES DAILY
Qty: 180 TABLET | Refills: 1 | Status: SHIPPED | OUTPATIENT
Start: 2022-03-29 | End: 2022-06-27

## 2022-03-29 NOTE — TELEPHONE ENCOUNTER
Nursing: Noted and appreciated. Please let the pt know that I have sent Protonix BID in place of Dexilant as this has been declined by insurance. Thank you!

## 2022-03-29 NOTE — TELEPHONE ENCOUNTER
Staci Irvin not authorized by insurance. May be able to attempt PA, but would likely be denied. Do you want to prescribe Pantoprazole 40 mg BID instead as discussed at OV on 3-23? If so, order pended. Thank you!

## 2022-05-05 ENCOUNTER — OFFICE VISIT (OUTPATIENT)
Dept: FAMILY MEDICINE CLINIC | Facility: CLINIC | Age: 42
End: 2022-05-05
Payer: COMMERCIAL

## 2022-05-05 VITALS
HEIGHT: 61 IN | RESPIRATION RATE: 18 BRPM | SYSTOLIC BLOOD PRESSURE: 139 MMHG | BODY MASS INDEX: 33.99 KG/M2 | DIASTOLIC BLOOD PRESSURE: 85 MMHG | TEMPERATURE: 99 F | WEIGHT: 180 LBS | OXYGEN SATURATION: 98 % | HEART RATE: 83 BPM

## 2022-05-05 DIAGNOSIS — R06.2 WHEEZING: ICD-10-CM

## 2022-05-05 DIAGNOSIS — J01.00 ACUTE MAXILLARY SINUSITIS, RECURRENCE NOT SPECIFIED: Primary | ICD-10-CM

## 2022-05-05 PROCEDURE — 3008F BODY MASS INDEX DOCD: CPT | Performed by: NURSE PRACTITIONER

## 2022-05-05 PROCEDURE — 99213 OFFICE O/P EST LOW 20 MIN: CPT | Performed by: NURSE PRACTITIONER

## 2022-05-05 PROCEDURE — 3075F SYST BP GE 130 - 139MM HG: CPT | Performed by: NURSE PRACTITIONER

## 2022-05-05 PROCEDURE — 3079F DIAST BP 80-89 MM HG: CPT | Performed by: NURSE PRACTITIONER

## 2022-05-05 RX ORDER — DOXYCYCLINE HYCLATE 100 MG
100 TABLET ORAL 2 TIMES DAILY
Qty: 14 TABLET | Refills: 0 | Status: SHIPPED | OUTPATIENT
Start: 2022-05-05 | End: 2022-05-12

## 2022-05-05 RX ORDER — ALBUTEROL SULFATE 90 UG/1
2 AEROSOL, METERED RESPIRATORY (INHALATION) EVERY 4 HOURS PRN
Qty: 1 EACH | Refills: 0 | Status: SHIPPED | OUTPATIENT
Start: 2022-05-05

## 2022-05-05 RX ORDER — BUPROPION HYDROCHLORIDE 150 MG/1
150 TABLET, EXTENDED RELEASE ORAL EVERY MORNING
COMMUNITY
Start: 2022-04-09

## 2022-05-06 LAB — SARS-COV-2 RNA RESP QL NAA+PROBE: NOT DETECTED

## 2022-05-10 ENCOUNTER — TELEPHONE (OUTPATIENT)
Dept: RHEUMATOLOGY | Facility: CLINIC | Age: 42
End: 2022-05-10

## 2022-05-20 ENCOUNTER — OFFICE VISIT (OUTPATIENT)
Dept: ENDOCRINOLOGY CLINIC | Facility: CLINIC | Age: 42
End: 2022-05-20
Payer: COMMERCIAL

## 2022-05-20 ENCOUNTER — LAB ENCOUNTER (OUTPATIENT)
Dept: LAB | Facility: HOSPITAL | Age: 42
End: 2022-05-20
Attending: INTERNAL MEDICINE
Payer: COMMERCIAL

## 2022-05-20 VITALS
SYSTOLIC BLOOD PRESSURE: 126 MMHG | BODY MASS INDEX: 36 KG/M2 | DIASTOLIC BLOOD PRESSURE: 85 MMHG | HEART RATE: 80 BPM | WEIGHT: 191 LBS

## 2022-05-20 DIAGNOSIS — N91.4 SECONDARY OLIGOMENORRHEA: ICD-10-CM

## 2022-05-20 DIAGNOSIS — R53.83 FATIGUE, UNSPECIFIED TYPE: Primary | ICD-10-CM

## 2022-05-20 DIAGNOSIS — L70.9 ACNE, UNSPECIFIED ACNE TYPE: ICD-10-CM

## 2022-05-20 DIAGNOSIS — R53.83 FATIGUE, UNSPECIFIED TYPE: ICD-10-CM

## 2022-05-20 LAB
CORTIS SERPL-MCNC: 7.6 UG/DL
DHEA-S SERPL-MCNC: 60.1 UG/DL
ESTRADIOL SERPL-MCNC: 44.5 PG/ML
FSH SERPL-ACNC: 5.6 MIU/ML
LH SERPL-ACNC: 2.4 MIU/ML
T4 FREE SERPL-MCNC: 0.7 NG/DL (ref 0.8–1.7)
THYROPEROXIDASE AB SERPL-ACNC: <28 U/ML (ref ?–60)
TSI SER-ACNC: 3.29 MIU/ML (ref 0.36–3.74)

## 2022-05-20 PROCEDURE — 84443 ASSAY THYROID STIM HORMONE: CPT

## 2022-05-20 PROCEDURE — 82627 DEHYDROEPIANDROSTERONE: CPT

## 2022-05-20 PROCEDURE — 36415 COLL VENOUS BLD VENIPUNCTURE: CPT

## 2022-05-20 PROCEDURE — 84403 ASSAY OF TOTAL TESTOSTERONE: CPT

## 2022-05-20 PROCEDURE — 86376 MICROSOMAL ANTIBODY EACH: CPT

## 2022-05-20 PROCEDURE — 82533 TOTAL CORTISOL: CPT

## 2022-05-20 PROCEDURE — 84402 ASSAY OF FREE TESTOSTERONE: CPT

## 2022-05-20 PROCEDURE — 84439 ASSAY OF FREE THYROXINE: CPT

## 2022-05-20 PROCEDURE — 83002 ASSAY OF GONADOTROPIN (LH): CPT

## 2022-05-20 PROCEDURE — 82670 ASSAY OF TOTAL ESTRADIOL: CPT

## 2022-05-20 PROCEDURE — 3074F SYST BP LT 130 MM HG: CPT | Performed by: INTERNAL MEDICINE

## 2022-05-20 PROCEDURE — 82024 ASSAY OF ACTH: CPT

## 2022-05-20 PROCEDURE — 83001 ASSAY OF GONADOTROPIN (FSH): CPT

## 2022-05-20 PROCEDURE — 3079F DIAST BP 80-89 MM HG: CPT | Performed by: INTERNAL MEDICINE

## 2022-05-20 PROCEDURE — 99204 OFFICE O/P NEW MOD 45 MIN: CPT | Performed by: INTERNAL MEDICINE

## 2022-05-22 LAB — ADRENOCORTICOTROPIC HORMONE: 11.9 PG/ML

## 2022-05-23 ENCOUNTER — TELEPHONE (OUTPATIENT)
Dept: ENDOCRINOLOGY CLINIC | Facility: CLINIC | Age: 42
End: 2022-05-23

## 2022-05-23 DIAGNOSIS — R53.83 FATIGUE, UNSPECIFIED TYPE: Primary | ICD-10-CM

## 2022-05-23 DIAGNOSIS — L70.9 ACNE, UNSPECIFIED ACNE TYPE: ICD-10-CM

## 2022-05-23 DIAGNOSIS — N91.4 SECONDARY OLIGOMENORRHEA: ICD-10-CM

## 2022-05-23 RX ORDER — LEVOTHYROXINE SODIUM 0.05 MG/1
50 TABLET ORAL
Qty: 90 TABLET | Refills: 1 | Status: SHIPPED | OUTPATIENT
Start: 2022-05-23

## 2022-05-23 NOTE — TELEPHONE ENCOUNTER
Spoke to patient to relay message below - patient stated understanding and will start levothyroxine 50mcg daily - administration reviewed with patient - patient stated understanding and denied further questions  Patient stated understanding to repeat labs in 2 months -labs ordered  RX sent

## 2022-05-23 NOTE — TELEPHONE ENCOUNTER
Please call patient - her labs demonstrate normal adrenal gland function. Normal estrogen levels. She does have some increased thyroid dysfunction on repeat labs. Lets try a low dose of thyroid hormone to see if it is helpful for her symptoms. Start Levothyroxine 50mcg PO daily #90, refill 1 and recheck TSH, FT4 in 2 months. Thanks.

## 2022-05-25 LAB — TESTOSTERONE, TOTAL, S: <7 NG/DL

## 2022-06-01 ENCOUNTER — OFFICE VISIT (OUTPATIENT)
Dept: RHEUMATOLOGY | Facility: CLINIC | Age: 42
End: 2022-06-01
Payer: COMMERCIAL

## 2022-06-01 ENCOUNTER — TELEPHONE (OUTPATIENT)
Dept: RHEUMATOLOGY | Facility: CLINIC | Age: 42
End: 2022-06-01

## 2022-06-01 VITALS
SYSTOLIC BLOOD PRESSURE: 121 MMHG | HEIGHT: 61 IN | RESPIRATION RATE: 16 BRPM | HEART RATE: 94 BPM | WEIGHT: 189 LBS | DIASTOLIC BLOOD PRESSURE: 80 MMHG | BODY MASS INDEX: 35.68 KG/M2

## 2022-06-01 DIAGNOSIS — Z51.81 THERAPEUTIC DRUG MONITORING: ICD-10-CM

## 2022-06-01 DIAGNOSIS — M45.9 ANKYLOSING SPONDYLITIS, UNSPECIFIED SITE OF SPINE (HCC): Primary | ICD-10-CM

## 2022-06-01 PROCEDURE — 3074F SYST BP LT 130 MM HG: CPT | Performed by: INTERNAL MEDICINE

## 2022-06-01 PROCEDURE — 3008F BODY MASS INDEX DOCD: CPT | Performed by: INTERNAL MEDICINE

## 2022-06-01 PROCEDURE — 3079F DIAST BP 80-89 MM HG: CPT | Performed by: INTERNAL MEDICINE

## 2022-06-01 PROCEDURE — 99214 OFFICE O/P EST MOD 30 MIN: CPT | Performed by: INTERNAL MEDICINE

## 2022-06-01 RX ORDER — MELOXICAM 15 MG/1
15 TABLET ORAL DAILY
Qty: 30 TABLET | Refills: 2 | Status: SHIPPED | OUTPATIENT
Start: 2022-06-01

## 2022-06-01 NOTE — TELEPHONE ENCOUNTER
Plan to get approval for  Taltz 160mg once , then 80mg every 4 weeks.    Will need PA -   She failed enbrel after 3 months - no response

## 2022-06-01 NOTE — PATIENT INSTRUCTIONS
Summary:  1. Plan to get approval for  Taltz 160mg once , then 80mg every 4 weeks. 2.check labs in 2 months. 3. Ibuprofen is ok   4. Return to clinic in 3 months   5.  Try meloxicam 15mg a day - stop naproxen

## 2022-06-01 NOTE — TELEPHONE ENCOUNTER
Called [facility] at [phone] to obtain PA form  PA completed over the phone and it was denied due to patient must try Cimzia and Humira in order to get Alen [place]ot approved. Please advise.      Case # [id number]

## 2022-06-02 NOTE — TELEPHONE ENCOUNTER
PA completed on [employer] for Cosentyx 300 mg loading dose and maintenance requested. Clinicals uploaded.

## 2022-06-02 NOTE — TELEPHONE ENCOUNTER
Talked to pt. - she would like to go back on cosentyx 300mg a month with loading doses   - please try to get loading doses approved 150mg dose for this is ok

## 2022-06-07 NOTE — TELEPHONE ENCOUNTER
Cosentyx 300 mg dose exceeds FDAs recommendation for her diagnosis. New PA submitted for Cosentyx 150 mg pen for loading and maintenance.

## 2022-06-22 ENCOUNTER — TELEPHONE (OUTPATIENT)
Dept: RHEUMATOLOGY | Facility: CLINIC | Age: 42
End: 2022-06-22

## 2022-06-22 NOTE — TELEPHONE ENCOUNTER
Patient is calling to advise Dr. Kelly Man she tested positive for COVID on 06/22/22 with two (2) at home tests. Patient notes at [address] [place] [address] , Dr. Christine Corrales if she ever tested positive to call her as he would be ordered a treatment. Please advise. Patient call being transferred to site.

## 2022-06-22 NOTE — TELEPHONE ENCOUNTER
Spoke to patient - name and  verified. She was informed of Dr. Jacinta Chaudhari note below and verbalized understanding.

## 2022-06-22 NOTE — TELEPHONE ENCOUNTER
Spoke with Dorothea WILL at [facility] and Cosentyx 150 mg pen (300 mg) was denied by pt's plan. Advised to contact member services at [phone]. Spoke with Yuan Mckenzie at [facility] and case is currently in appeal with decision by the end of the day on 6/28/22.

## 2022-06-22 NOTE — TELEPHONE ENCOUNTER
Sent in paxlovid - if she starts not feeling well with covid in the next 2-3 days ,  the other option is the monoclonal ab infusion - please let her know

## 2022-06-22 NOTE — TELEPHONE ENCOUNTER
Ginger Corey from [facility] called about following medication, wanted to let dr now it is interfering with other medication and asking for confirmation to fill:     nirmatrelvir & ritonavir (PAXLOVID) 20 x 150 MG & 10 x 100MG Oral Tablet Therapy Pack            Summary: Take two nirmatrelvir tablets (300mg) with one ritonavir tablet (100mg) together twice daily for 5 days. , Normal, Disp-30 tablet, R

## 2022-06-23 ENCOUNTER — TELEPHONE (OUTPATIENT)
Dept: CASE MANAGEMENT | Age: 42
End: 2022-06-23

## 2022-06-23 ENCOUNTER — TELEPHONE (OUTPATIENT)
Dept: INTERNAL MEDICINE CLINIC | Facility: CLINIC | Age: 42
End: 2022-06-23

## 2022-06-23 ENCOUNTER — HOSPITAL ENCOUNTER (OUTPATIENT)
Age: 42
Discharge: HOME | End: 2022-06-23
Payer: COMMERCIAL

## 2022-06-23 VITALS
HEIGHT: 61 IN | WEIGHT: 185 LBS | BODY MASS INDEX: 34.93 KG/M2 | OXYGEN SATURATION: 98 % | DIASTOLIC BLOOD PRESSURE: 74 MMHG | RESPIRATION RATE: 20 BRPM | HEART RATE: 97 BPM | TEMPERATURE: 98 F | SYSTOLIC BLOOD PRESSURE: 133 MMHG

## 2022-06-23 DIAGNOSIS — U07.1 COVID-19 VIRUS INFECTION: Primary | ICD-10-CM

## 2022-06-23 DIAGNOSIS — Z20.822 ENCOUNTER FOR LABORATORY TESTING FOR COVID-19 VIRUS: ICD-10-CM

## 2022-06-23 LAB — SARS-COV-2 RNA RESP QL NAA+PROBE: DETECTED

## 2022-06-23 PROCEDURE — U0002 COVID-19 LAB TEST NON-CDC: HCPCS | Performed by: NURSE PRACTITIONER

## 2022-06-23 PROCEDURE — 99214 OFFICE O/P EST MOD 30 MIN: CPT | Performed by: NURSE PRACTITIONER

## 2022-06-23 PROCEDURE — M0222 INTRAVENOUS INJECTION, BEBTELOVIMAB, INCLUDES INJECTION AND POST ADMINISTRATIVE MONITORING: HCPCS | Performed by: NURSE PRACTITIONER

## 2022-06-23 RX ORDER — BEBTELOVIMAB 87.5 MG/ML
175 INJECTION, SOLUTION INTRAVENOUS ONCE
Status: COMPLETED | OUTPATIENT
Start: 2022-06-23 | End: 2022-06-23

## 2022-06-23 NOTE — TELEPHONE ENCOUNTER
Pt received MAB infusion at [facility] on 6/23/22 for COVID-19. Please follow-up with pt for post-infusion assessment and home monitoring if needed. Thank you.

## 2022-06-23 NOTE — TELEPHONE ENCOUNTER
Pt went to the [facility] per PCP to possibly get mAb infusion therapy. Should she still get this medication? Please advise.

## 2022-06-23 NOTE — TELEPHONE ENCOUNTER
Patient call received stating she starting feeling ill on Monday 6/21 with a headache that became progressively worse by Tuesday followed by chills, fever of 102 (treated w/ tylenol), myalgia, sinus congestion and yellowish drainage. Self-tested and positive for Covid on 6/22/22 (yesterday). States that her congestion is now settling into her chest area and she is coughing up clear phlegm, headache persists on and off. Pt states that due to her underlying autoimmune conditions, she has previously discussed with her Rheumatologist what to do should she become infected w/ Covid/ develop symptoms and she was advised that she should seek mAb infusion therapy. Patient calling today for Dr Fely Chavez to order mAb therapy for her. Patient informed that she needs to [facility] and [facility]the [address] [place] [address] site is closest to her home) where she will be evaluated and the evaluating provider will order the infusion/ staff will administer. Pt advised to arrive prior to 6 pm despite [facility] closing time of 7;30p given there is a post infusion observation period required as well. Pt verbalizes understanding and plans to leave for the [facility] within the hour.

## 2022-06-23 NOTE — ED INITIAL ASSESSMENT (HPI)
Pt states on Monday she started with vomiting. Pt had a positive covid test yesterday. Pt called her doctor and Paxlovid was sent but it interacts with one of her medication, so she was sent here for infusion.

## 2022-06-23 NOTE — TELEPHONE ENCOUNTER
Qian Santos from [facility] calling again, states they need an answer as soon as possible, contacted rheum  and stated they will let nurses know.  Qian Santos states they will be closing for lunch and will reopen at 2

## 2022-06-24 ENCOUNTER — TELEPHONE (OUTPATIENT)
Dept: INTERNAL MEDICINE CLINIC | Facility: CLINIC | Age: 42
End: 2022-06-24

## 2022-06-24 NOTE — TELEPHONE ENCOUNTER
Can't stop coughing, starting to cough up yellow phlegm, using inhaler more frequently (several times per hour). Concerned about the non-stop coughing. Able to catch her breath. Patient coughing during this call; between hoarse and croupy sounding; bronchial    Not really feeling better after the infusion yesterday afternoon, but was informed it may take up to 48 hours before she notices improvement. Reminded patient that if she finds herself struggling for breaths to get to the ER. Verbalized understanding.

## 2022-06-24 NOTE — TELEPHONE ENCOUNTER
Pharmacy is following up and requesting a response today. Pharmacy was informed Dr. Kelly Man is out of the office and will not return until Monday. Per pharmacy, they have been trying to get a response for 3 days and would like to know if the on call provider can review request. Please advise.

## 2022-06-29 NOTE — TELEPHONE ENCOUNTER
Spoke with Dior Evans at [facility] and PA denied for Cosentyx 150 mg pen. To fax over form for office to complete for additional clinicals for appeal.     VÍCTOR Fernandez both Cosentyx 300 mg and 150 mg pen have been denied. Did you want to try another medication?

## 2022-06-30 NOTE — TELEPHONE ENCOUNTER
contact member services at [phone] - spoke to [employer] # - [phone]  Appeal is not approved and they will re-fax the denial. She said it is not approved because of the dosage request. It is not covered under the patient plan.

## 2022-06-30 NOTE — TELEPHONE ENCOUNTER
Left detailed message for [facility] to cancel script for Paxlovid due to drug interaction. Call office back with questions.

## 2022-07-01 NOTE — TELEPHONE ENCOUNTER
Called to Provider Auth # - [id number] listed on denial later. Denial letter was the same on from 6/7/22. Spoke with Angeline and was informed that we submitted new PA with 150mg. However, she was not able to find it. She was informed that it was done on "[employer] and states sent to [employer]. She said normally [employer] would send that information to them. However, I gave her case # generated by [employer] on [employer] and she was able to retrieve that information. She has marked it urgent.    Case # [id number]

## 2022-07-06 ENCOUNTER — OFFICE VISIT (OUTPATIENT)
Dept: FAMILY MEDICINE CLINIC | Facility: CLINIC | Age: 42
End: 2022-07-06
Payer: COMMERCIAL

## 2022-07-06 VITALS
HEART RATE: 79 BPM | OXYGEN SATURATION: 98 % | RESPIRATION RATE: 16 BRPM | DIASTOLIC BLOOD PRESSURE: 71 MMHG | BODY MASS INDEX: 36.4 KG/M2 | HEIGHT: 61 IN | WEIGHT: 192.81 LBS | SYSTOLIC BLOOD PRESSURE: 135 MMHG | TEMPERATURE: 99 F

## 2022-07-06 DIAGNOSIS — R05.8 POST-VIRAL COUGH SYNDROME: Primary | ICD-10-CM

## 2022-07-06 DIAGNOSIS — J02.9 SORE THROAT: ICD-10-CM

## 2022-07-06 PROBLEM — M79.10 MYALGIA: Status: ACTIVE | Noted: 2022-07-06

## 2022-07-06 PROBLEM — R20.0 LEFT LEG NUMBNESS: Status: ACTIVE | Noted: 2022-07-06

## 2022-07-06 PROBLEM — R29.898 LEFT HAND WEAKNESS: Status: ACTIVE | Noted: 2022-07-06

## 2022-07-06 PROCEDURE — 3008F BODY MASS INDEX DOCD: CPT

## 2022-07-06 PROCEDURE — 3078F DIAST BP <80 MM HG: CPT

## 2022-07-06 PROCEDURE — 87880 STREP A ASSAY W/OPTIC: CPT

## 2022-07-06 PROCEDURE — 99213 OFFICE O/P EST LOW 20 MIN: CPT

## 2022-07-06 PROCEDURE — 3075F SYST BP GE 130 - 139MM HG: CPT

## 2022-07-06 RX ORDER — FLUTICASONE FUROATE 27.5 UG/1
2 SPRAY, METERED NASAL DAILY
Qty: 1 EACH | Refills: 0 | Status: SHIPPED | OUTPATIENT
Start: 2022-07-06

## 2022-07-06 RX ORDER — BENZONATATE 100 MG/1
100 CAPSULE ORAL 3 TIMES DAILY PRN
Qty: 15 CAPSULE | Refills: 0 | Status: SHIPPED | OUTPATIENT
Start: 2022-07-06 | End: 2022-07-11

## 2022-07-19 ENCOUNTER — PATIENT MESSAGE (OUTPATIENT)
Dept: RHEUMATOLOGY | Facility: CLINIC | Age: 42
End: 2022-07-19

## 2022-07-20 ENCOUNTER — TELEPHONE (OUTPATIENT)
Dept: RHEUMATOLOGY | Facility: CLINIC | Age: 42
End: 2022-07-20

## 2022-07-20 ENCOUNTER — LAB ENCOUNTER (OUTPATIENT)
Dept: LAB | Facility: HOSPITAL | Age: 42
End: 2022-07-20
Attending: INTERNAL MEDICINE
Payer: COMMERCIAL

## 2022-07-20 DIAGNOSIS — Z51.81 THERAPEUTIC DRUG MONITORING: ICD-10-CM

## 2022-07-20 DIAGNOSIS — L70.9 ACNE, UNSPECIFIED ACNE TYPE: ICD-10-CM

## 2022-07-20 DIAGNOSIS — N91.4 SECONDARY OLIGOMENORRHEA: ICD-10-CM

## 2022-07-20 DIAGNOSIS — M45.9 ANKYLOSING SPONDYLITIS, UNSPECIFIED SITE OF SPINE (HCC): ICD-10-CM

## 2022-07-20 DIAGNOSIS — R53.83 FATIGUE, UNSPECIFIED TYPE: ICD-10-CM

## 2022-07-20 LAB
ALBUMIN SERPL-MCNC: 3.6 G/DL (ref 3.4–5)
ALBUMIN/GLOB SERPL: 1 {RATIO} (ref 1–2)
ALP LIVER SERPL-CCNC: 89 U/L
ALT SERPL-CCNC: 28 U/L
ANION GAP SERPL CALC-SCNC: 7 MMOL/L (ref 0–18)
AST SERPL-CCNC: 16 U/L (ref 15–37)
BILIRUB SERPL-MCNC: 0.4 MG/DL (ref 0.1–2)
BUN BLD-MCNC: 11 MG/DL (ref 7–18)
BUN/CREAT SERPL: 15.9 (ref 10–20)
CALCIUM BLD-MCNC: 8.8 MG/DL (ref 8.5–10.1)
CHLORIDE SERPL-SCNC: 108 MMOL/L (ref 98–112)
CO2 SERPL-SCNC: 25 MMOL/L (ref 21–32)
CREAT BLD-MCNC: 0.69 MG/DL
CRP SERPL-MCNC: 0.44 MG/DL (ref ?–0.3)
DEPRECATED RDW RBC AUTO: 39.5 FL (ref 35.1–46.3)
ERYTHROCYTE [DISTWIDTH] IN BLOOD BY AUTOMATED COUNT: 12.4 % (ref 11–15)
ERYTHROCYTE [SEDIMENTATION RATE] IN BLOOD: 16 MM/HR
FASTING STATUS PATIENT QL REPORTED: YES
GLOBULIN PLAS-MCNC: 3.6 G/DL (ref 2.8–4.4)
GLUCOSE BLD-MCNC: 98 MG/DL (ref 70–99)
HCT VFR BLD AUTO: 38.7 %
HGB BLD-MCNC: 13 G/DL
MCH RBC QN AUTO: 29.4 PG (ref 26–34)
MCHC RBC AUTO-ENTMCNC: 33.6 G/DL (ref 31–37)
MCV RBC AUTO: 87.6 FL
OSMOLALITY SERPL CALC.SUM OF ELEC: 289 MOSM/KG (ref 275–295)
PLATELET # BLD AUTO: 326 10(3)UL (ref 150–450)
POTASSIUM SERPL-SCNC: 4.2 MMOL/L (ref 3.5–5.1)
PROT SERPL-MCNC: 7.2 G/DL (ref 6.4–8.2)
RBC # BLD AUTO: 4.42 X10(6)UL
SODIUM SERPL-SCNC: 140 MMOL/L (ref 136–145)
T4 FREE SERPL-MCNC: 0.9 NG/DL (ref 0.8–1.7)
TSI SER-ACNC: 1.03 MIU/ML (ref 0.36–3.74)
WBC # BLD AUTO: 7.4 X10(3) UL (ref 4–11)

## 2022-07-20 PROCEDURE — 80053 COMPREHEN METABOLIC PANEL: CPT

## 2022-07-20 PROCEDURE — 85027 COMPLETE CBC AUTOMATED: CPT

## 2022-07-20 PROCEDURE — 84439 ASSAY OF FREE THYROXINE: CPT

## 2022-07-20 PROCEDURE — 36415 COLL VENOUS BLD VENIPUNCTURE: CPT

## 2022-07-20 PROCEDURE — 86140 C-REACTIVE PROTEIN: CPT

## 2022-07-20 PROCEDURE — 85652 RBC SED RATE AUTOMATED: CPT

## 2022-07-20 PROCEDURE — 84443 ASSAY THYROID STIM HORMONE: CPT

## 2022-07-20 RX ORDER — METHYLPREDNISOLONE 4 MG/1
TABLET ORAL
Qty: 1 EACH | Refills: 0 | Status: SHIPPED | OUTPATIENT
Start: 2022-07-20

## 2022-07-20 NOTE — TELEPHONE ENCOUNTER
Pt feels she might be having a flare up. Increase pain in hips and knees since weekend. She reports increased difficulty with movement. She is taking meloxicam 15 mg daily. Please advise. Pt is aware off is working on getting Cosnetyx 150 mg pen approved by insurance.

## 2022-07-20 NOTE — TELEPHONE ENCOUNTER
She is having bad pain in her hips and knees. She's on melxoicam 15mg daily. Labs are normal -   Just had covid on 6/23 - she could have flare from this.    Had ab infusion on 6/23 -   Send in medrol radha   She will take klonipin

## 2022-07-20 NOTE — TELEPHONE ENCOUNTER
From: Nasir Sun  To: Leonidas Huynh MD  Sent: 7/19/2022 6:48 PM CDT  Subject: Insurance PreApproval    Good evening, I wanted to check the status of whether my insurance has approved Cosentyx. Please let me know if you need anything from me. Thanks!

## 2022-07-20 NOTE — TELEPHONE ENCOUNTER
Spoke with Angeline at [facility] and case request for Cosentyx 150 mg pen was seen and urgently submitted for PA request. She asked that clinicals be faxed to [phone] with case number [id number]. Clinicals have been faxed as requested. Pt contacted and explained office is working to get Cosentyx 150 mg approved.

## 2022-07-21 RX ORDER — SECUKINUMAB 150 MG/ML
INJECTION SUBCUTANEOUS
Qty: 5 EACH | Refills: 0 | Status: SHIPPED | OUTPATIENT
Start: 2022-07-21

## 2022-07-21 RX ORDER — SECUKINUMAB 150 MG/ML
150 INJECTION SUBCUTANEOUS
Qty: 1 EACH | Refills: 5 | Status: SHIPPED | OUTPATIENT
Start: 2022-07-21

## 2022-07-21 NOTE — TELEPHONE ENCOUNTER
PA approved for Cosentyx 150 mg pen from 7/21/2022 to 7/20/23 [id number].  Pt contacted and aware of approval.

## 2022-07-26 ENCOUNTER — APPOINTMENT (OUTPATIENT)
Dept: GENERAL RADIOLOGY | Age: 42
End: 2022-07-26
Attending: NURSE PRACTITIONER
Payer: COMMERCIAL

## 2022-07-26 ENCOUNTER — HOSPITAL ENCOUNTER (OUTPATIENT)
Age: 42
Discharge: HOME | End: 2022-07-26
Payer: COMMERCIAL

## 2022-07-26 VITALS
SYSTOLIC BLOOD PRESSURE: 129 MMHG | OXYGEN SATURATION: 99 % | HEART RATE: 73 BPM | TEMPERATURE: 98 F | DIASTOLIC BLOOD PRESSURE: 85 MMHG | RESPIRATION RATE: 18 BRPM

## 2022-07-26 DIAGNOSIS — S99.922A INJURY OF TOE ON LEFT FOOT, INITIAL ENCOUNTER: Primary | ICD-10-CM

## 2022-07-26 PROCEDURE — 73660 X-RAY EXAM OF TOE(S): CPT | Performed by: NURSE PRACTITIONER

## 2022-07-26 PROCEDURE — 99213 OFFICE O/P EST LOW 20 MIN: CPT | Performed by: NURSE PRACTITIONER

## 2022-08-31 ENCOUNTER — TELEPHONE (OUTPATIENT)
Dept: RHEUMATOLOGY | Facility: CLINIC | Age: 42
End: 2022-08-31

## 2022-09-02 ENCOUNTER — OFFICE VISIT (OUTPATIENT)
Dept: FAMILY MEDICINE CLINIC | Facility: CLINIC | Age: 42
End: 2022-09-02
Payer: COMMERCIAL

## 2022-09-02 VITALS
OXYGEN SATURATION: 98 % | HEART RATE: 83 BPM | SYSTOLIC BLOOD PRESSURE: 129 MMHG | HEIGHT: 61 IN | DIASTOLIC BLOOD PRESSURE: 76 MMHG | RESPIRATION RATE: 18 BRPM | BODY MASS INDEX: 36.25 KG/M2 | TEMPERATURE: 97 F | WEIGHT: 192 LBS

## 2022-09-02 DIAGNOSIS — Z76.0 MEDICATION REFILL: ICD-10-CM

## 2022-09-02 DIAGNOSIS — J01.90 ACUTE SINUSITIS, RECURRENCE NOT SPECIFIED, UNSPECIFIED LOCATION: Primary | ICD-10-CM

## 2022-09-02 PROCEDURE — 3008F BODY MASS INDEX DOCD: CPT | Performed by: NURSE PRACTITIONER

## 2022-09-02 PROCEDURE — 3074F SYST BP LT 130 MM HG: CPT | Performed by: NURSE PRACTITIONER

## 2022-09-02 PROCEDURE — 99213 OFFICE O/P EST LOW 20 MIN: CPT | Performed by: NURSE PRACTITIONER

## 2022-09-02 PROCEDURE — 3078F DIAST BP <80 MM HG: CPT | Performed by: NURSE PRACTITIONER

## 2022-09-02 RX ORDER — ALBUTEROL SULFATE 90 UG/1
2 AEROSOL, METERED RESPIRATORY (INHALATION) EVERY 4 HOURS PRN
Qty: 1 EACH | Refills: 0 | Status: SHIPPED | OUTPATIENT
Start: 2022-09-02

## 2022-09-02 RX ORDER — DOXYCYCLINE HYCLATE 100 MG
100 TABLET ORAL 2 TIMES DAILY
Qty: 14 TABLET | Refills: 0 | Status: SHIPPED | OUTPATIENT
Start: 2022-09-02 | End: 2022-09-09

## 2022-09-03 RX ORDER — MELOXICAM 15 MG/1
15 TABLET ORAL DAILY
Qty: 30 TABLET | Refills: 2 | Status: SHIPPED | OUTPATIENT
Start: 2022-09-03

## 2022-09-06 RX ORDER — SECUKINUMAB 150 MG/ML
INJECTION SUBCUTANEOUS
Refills: 0 | OUTPATIENT
Start: 2022-09-06

## 2022-09-06 NOTE — TELEPHONE ENCOUNTER
Dr. Wai Oneil - please review message below and advise on restarting Cosentyx.
Identified pt with two pt identifiers(name and ). Reviewed record in preparation for visit and have obtained necessary documentation. No chief complaint on file. There were no vitals filed for this visit. Health Maintenance Review: Patient reminded of \"due or due soon\" health maintenance. I have asked the patient to contact his/her primary care provider (PCP) for follow-up on his/her health maintenance. Coordination of Care Questionnaire:  :   1) Have you been to an emergency room, urgent care, or hospitalized since your last visit? If yes, where when, and reason for visit? Yes, [facility] 10/12/21 seizure      2. Have seen or consulted any other health care provider since your last visit? If yes, where when, and reason for visit? NO      Patient is accompanied by self I have received verbal consent from Citlalli Lopez to discuss any/all medical information while they are present in the room.
Left message she can start Cosentyx 2 weeks after surgery and to call office back with any questions.
Patient had shoulder surgery recently and was instructed not to take anti inflammatory medication by her orthopedic surgeon. Surgeon recommended patient to speak with Dr. Maribell Bhardwaj in regards to re-starting Cosentyx to make sure it will not interferer with her healing after her surgery.  Please advise
Please let pt.  Know she can restart cosentyx 2 weeks after her surgery
52

## 2022-10-03 RX ORDER — PANTOPRAZOLE SODIUM 40 MG/1
40 TABLET, DELAYED RELEASE ORAL 2 TIMES DAILY
Qty: 180 TABLET | Refills: 3 | Status: SHIPPED | OUTPATIENT
Start: 2022-10-03

## 2022-11-10 RX ORDER — LEVOTHYROXINE SODIUM 0.05 MG/1
50 TABLET ORAL
Qty: 90 TABLET | Refills: 0 | Status: SHIPPED | OUTPATIENT
Start: 2022-11-10

## 2022-11-10 NOTE — TELEPHONE ENCOUNTER
LOV: 5/20/22  RTC PRN. TE 5/23/22 noted patient was started on levothyroxine and had repeat labs in July. Dr. Yogesh Salinas -- did you want this patient to follow up on a yearly basis since she's getting medication refills from the clinic? Thank you.

## 2022-11-15 ENCOUNTER — OFFICE VISIT (OUTPATIENT)
Dept: FAMILY MEDICINE CLINIC | Facility: CLINIC | Age: 42
End: 2022-11-15
Payer: COMMERCIAL

## 2022-11-15 VITALS
TEMPERATURE: 98 F | DIASTOLIC BLOOD PRESSURE: 64 MMHG | SYSTOLIC BLOOD PRESSURE: 108 MMHG | WEIGHT: 185 LBS | BODY MASS INDEX: 34.93 KG/M2 | RESPIRATION RATE: 18 BRPM | OXYGEN SATURATION: 98 % | HEIGHT: 61 IN | HEART RATE: 94 BPM

## 2022-11-15 DIAGNOSIS — J01.90 ACUTE RHINOSINUSITIS: Primary | ICD-10-CM

## 2022-11-15 PROCEDURE — 99213 OFFICE O/P EST LOW 20 MIN: CPT | Performed by: PHYSICIAN ASSISTANT

## 2022-11-15 PROCEDURE — 3074F SYST BP LT 130 MM HG: CPT | Performed by: PHYSICIAN ASSISTANT

## 2022-11-15 PROCEDURE — 3008F BODY MASS INDEX DOCD: CPT | Performed by: PHYSICIAN ASSISTANT

## 2022-11-15 PROCEDURE — 3078F DIAST BP <80 MM HG: CPT | Performed by: PHYSICIAN ASSISTANT

## 2022-11-15 RX ORDER — SECUKINUMAB 150 MG/ML
150 INJECTION SUBCUTANEOUS
COMMUNITY

## 2022-11-15 RX ORDER — DOXYCYCLINE HYCLATE 100 MG/1
100 CAPSULE ORAL 2 TIMES DAILY
Qty: 14 CAPSULE | Refills: 0 | Status: SHIPPED | OUTPATIENT
Start: 2022-11-15 | End: 2022-11-22

## 2022-11-16 LAB — SARS-COV-2 RNA RESP QL NAA+PROBE: NOT DETECTED

## 2022-12-20 ENCOUNTER — HOSPITAL ENCOUNTER (OUTPATIENT)
Dept: GENERAL RADIOLOGY | Facility: HOSPITAL | Age: 42
Discharge: HOME | End: 2022-12-20
Attending: INTERNAL MEDICINE
Payer: COMMERCIAL

## 2022-12-20 ENCOUNTER — LAB ENCOUNTER (OUTPATIENT)
Dept: LAB | Facility: HOSPITAL | Age: 42
End: 2022-12-20
Attending: INTERNAL MEDICINE
Payer: COMMERCIAL

## 2022-12-20 ENCOUNTER — TELEPHONE (OUTPATIENT)
Dept: RHEUMATOLOGY | Facility: CLINIC | Age: 42
End: 2022-12-20

## 2022-12-20 ENCOUNTER — OFFICE VISIT (OUTPATIENT)
Dept: RHEUMATOLOGY | Facility: CLINIC | Age: 42
End: 2022-12-20
Payer: COMMERCIAL

## 2022-12-20 VITALS
HEART RATE: 87 BPM | HEIGHT: 61 IN | RESPIRATION RATE: 16 BRPM | SYSTOLIC BLOOD PRESSURE: 123 MMHG | DIASTOLIC BLOOD PRESSURE: 83 MMHG | BODY MASS INDEX: 37.22 KG/M2 | WEIGHT: 197.13 LBS

## 2022-12-20 DIAGNOSIS — E55.9 VITAMIN D DEFICIENCY: ICD-10-CM

## 2022-12-20 DIAGNOSIS — Z51.81 THERAPEUTIC DRUG MONITORING: ICD-10-CM

## 2022-12-20 DIAGNOSIS — M45.9 ANKYLOSING SPONDYLITIS, UNSPECIFIED SITE OF SPINE (HCC): ICD-10-CM

## 2022-12-20 DIAGNOSIS — M45.9 ANKYLOSING SPONDYLITIS, UNSPECIFIED SITE OF SPINE (HCC): Primary | ICD-10-CM

## 2022-12-20 LAB
ALBUMIN SERPL-MCNC: 3.8 G/DL (ref 3.4–5)
ALBUMIN/GLOB SERPL: 1 {RATIO} (ref 1–2)
ALP LIVER SERPL-CCNC: 109 U/L
ALT SERPL-CCNC: 32 U/L
ANION GAP SERPL CALC-SCNC: 7 MMOL/L (ref 0–18)
AST SERPL-CCNC: 15 U/L (ref 15–37)
BASOPHILS # BLD AUTO: 0.04 X10(3) UL (ref 0–0.2)
BASOPHILS NFR BLD AUTO: 0.4 %
BILIRUB SERPL-MCNC: 0.3 MG/DL (ref 0.1–2)
BUN BLD-MCNC: 11 MG/DL (ref 7–18)
BUN/CREAT SERPL: 16.7 (ref 10–20)
CALCIUM BLD-MCNC: 9.2 MG/DL (ref 8.5–10.1)
CHLORIDE SERPL-SCNC: 106 MMOL/L (ref 98–112)
CO2 SERPL-SCNC: 26 MMOL/L (ref 21–32)
CREAT BLD-MCNC: 0.66 MG/DL
CRP SERPL-MCNC: 0.55 MG/DL (ref ?–0.3)
DEPRECATED RDW RBC AUTO: 39.6 FL (ref 35.1–46.3)
EOSINOPHIL # BLD AUTO: 0.08 X10(3) UL (ref 0–0.7)
EOSINOPHIL NFR BLD AUTO: 0.9 %
ERYTHROCYTE [DISTWIDTH] IN BLOOD BY AUTOMATED COUNT: 12.3 % (ref 11–15)
ERYTHROCYTE [SEDIMENTATION RATE] IN BLOOD: 29 MM/HR
FASTING STATUS PATIENT QL REPORTED: YES
GFR SERPLBLD BASED ON 1.73 SQ M-ARVRAT: 114 ML/MIN/1.73M2 (ref 60–?)
GLOBULIN PLAS-MCNC: 3.7 G/DL (ref 2.8–4.4)
GLUCOSE BLD-MCNC: 87 MG/DL (ref 70–99)
HCT VFR BLD AUTO: 39 %
HGB BLD-MCNC: 13.2 G/DL
IMM GRANULOCYTES # BLD AUTO: 0.07 X10(3) UL (ref 0–1)
IMM GRANULOCYTES NFR BLD: 0.8 %
LYMPHOCYTES # BLD AUTO: 2.33 X10(3) UL (ref 1–4)
LYMPHOCYTES NFR BLD AUTO: 25.2 %
MCH RBC QN AUTO: 29.9 PG (ref 26–34)
MCHC RBC AUTO-ENTMCNC: 33.8 G/DL (ref 31–37)
MCV RBC AUTO: 88.2 FL
MONOCYTES # BLD AUTO: 0.52 X10(3) UL (ref 0.1–1)
MONOCYTES NFR BLD AUTO: 5.6 %
NEUTROPHILS # BLD AUTO: 6.2 X10 (3) UL (ref 1.5–7.7)
NEUTROPHILS # BLD AUTO: 6.2 X10(3) UL (ref 1.5–7.7)
NEUTROPHILS NFR BLD AUTO: 67.1 %
OSMOLALITY SERPL CALC.SUM OF ELEC: 287 MOSM/KG (ref 275–295)
PLATELET # BLD AUTO: 337 10(3)UL (ref 150–450)
POTASSIUM SERPL-SCNC: 4.2 MMOL/L (ref 3.5–5.1)
PROT SERPL-MCNC: 7.5 G/DL (ref 6.4–8.2)
RBC # BLD AUTO: 4.42 X10(6)UL
SODIUM SERPL-SCNC: 139 MMOL/L (ref 136–145)
VIT D+METAB SERPL-MCNC: 17.6 NG/ML (ref 30–100)
WBC # BLD AUTO: 9.2 X10(3) UL (ref 4–11)

## 2022-12-20 PROCEDURE — 3079F DIAST BP 80-89 MM HG: CPT | Performed by: INTERNAL MEDICINE

## 2022-12-20 PROCEDURE — 85025 COMPLETE CBC W/AUTO DIFF WBC: CPT

## 2022-12-20 PROCEDURE — 36415 COLL VENOUS BLD VENIPUNCTURE: CPT

## 2022-12-20 PROCEDURE — 85652 RBC SED RATE AUTOMATED: CPT

## 2022-12-20 PROCEDURE — 72052 X-RAY EXAM NECK SPINE 6/>VWS: CPT | Performed by: INTERNAL MEDICINE

## 2022-12-20 PROCEDURE — 86480 TB TEST CELL IMMUN MEASURE: CPT

## 2022-12-20 PROCEDURE — 82306 VITAMIN D 25 HYDROXY: CPT

## 2022-12-20 PROCEDURE — 99214 OFFICE O/P EST MOD 30 MIN: CPT | Performed by: INTERNAL MEDICINE

## 2022-12-20 PROCEDURE — 80053 COMPREHEN METABOLIC PANEL: CPT

## 2022-12-20 PROCEDURE — 86140 C-REACTIVE PROTEIN: CPT

## 2022-12-20 PROCEDURE — 3008F BODY MASS INDEX DOCD: CPT | Performed by: INTERNAL MEDICINE

## 2022-12-20 PROCEDURE — 3074F SYST BP LT 130 MM HG: CPT | Performed by: INTERNAL MEDICINE

## 2022-12-20 PROCEDURE — 72202 X-RAY EXAM SI JOINTS 3/> VWS: CPT | Performed by: INTERNAL MEDICINE

## 2022-12-20 RX ORDER — METHYLPREDNISOLONE 4 MG/1
TABLET ORAL
Qty: 1 EACH | Refills: 0 | Status: SHIPPED | OUTPATIENT
Start: 2022-12-20

## 2022-12-20 RX ORDER — DICLOFENAC SODIUM 75 MG/1
75 TABLET, DELAYED RELEASE ORAL 2 TIMES DAILY
Qty: 60 TABLET | Refills: 3 | Status: SHIPPED | OUTPATIENT
Start: 2022-12-20

## 2022-12-20 NOTE — TELEPHONE ENCOUNTER
Plan to start taltz at beginning of the year with new insurance - it's on the formulary per pt.    - start taltz 160mg once , then 80mg every 4 weeks- will need PA

## 2022-12-22 LAB
M TB IFN-G CD4+ T-CELLS BLD-ACNC: 0.01 IU/ML
M TB TUBERC IFN-G BLD QL: NEGATIVE
M TB TUBERC IGNF/MITOGEN IGNF CONTROL: >10 IU/ML
QFT TB1 AG MINUS NIL: 0.02 IU/ML
QFT TB2 AG MINUS NIL: 0.02 IU/ML

## 2022-12-30 ENCOUNTER — OFFICE VISIT (OUTPATIENT)
Dept: FAMILY MEDICINE CLINIC | Facility: CLINIC | Age: 42
End: 2022-12-30
Payer: COMMERCIAL

## 2022-12-30 VITALS
HEART RATE: 77 BPM | BODY MASS INDEX: 36.82 KG/M2 | RESPIRATION RATE: 12 BRPM | OXYGEN SATURATION: 98 % | HEIGHT: 61 IN | WEIGHT: 195 LBS

## 2022-12-30 DIAGNOSIS — B97.89 VIRAL SINUSITIS: Primary | ICD-10-CM

## 2022-12-30 DIAGNOSIS — J32.9 VIRAL SINUSITIS: Primary | ICD-10-CM

## 2022-12-30 PROCEDURE — 99213 OFFICE O/P EST LOW 20 MIN: CPT | Performed by: NURSE PRACTITIONER

## 2022-12-30 PROCEDURE — 3008F BODY MASS INDEX DOCD: CPT | Performed by: NURSE PRACTITIONER

## 2022-12-30 RX ORDER — BENZONATATE 200 MG/1
200 CAPSULE ORAL 3 TIMES DAILY PRN
Qty: 30 CAPSULE | Refills: 0 | Status: SHIPPED | OUTPATIENT
Start: 2022-12-30

## 2022-12-30 RX ORDER — FLUTICASONE PROPIONATE 50 MCG
2 SPRAY, SUSPENSION (ML) NASAL NIGHTLY
Qty: 1 EACH | Refills: 0 | Status: SHIPPED | OUTPATIENT
Start: 2022-12-30

## 2023-01-01 ENCOUNTER — TELEPHONE (OUTPATIENT)
Dept: FAMILY MEDICINE CLINIC | Facility: CLINIC | Age: 43
End: 2023-01-01

## 2023-01-01 DIAGNOSIS — J01.91 ACUTE RECURRENT SINUSITIS, UNSPECIFIED LOCATION: Primary | ICD-10-CM

## 2023-01-01 RX ORDER — DOXYCYCLINE HYCLATE 100 MG/1
100 CAPSULE ORAL 2 TIMES DAILY
Qty: 14 CAPSULE | Refills: 0 | Status: SHIPPED | OUTPATIENT
Start: 2023-01-01 | End: 2023-01-08

## 2023-01-01 NOTE — TELEPHONE ENCOUNTER
Pt with hx with recurrent sinusitis. Rx Doxycycline. Pt reports worsening sinus pain, difficulty sleeping due to pressure, and symptoms worse with bending down. Day 8. Risks, benefits, and side effects of medication discussed with patient. 1. Acute recurrent sinusitis, unspecified location  - doxycycline 100 MG Oral Cap; Take 1 capsule (100 mg total) by mouth 2 (two) times daily for 7 days. Dispense: 14 capsule;  Refill: 0

## 2023-01-04 ENCOUNTER — PATIENT MESSAGE (OUTPATIENT)
Dept: RHEUMATOLOGY | Facility: CLINIC | Age: 43
End: 2023-01-04

## 2023-01-04 NOTE — TELEPHONE ENCOUNTER
From: Joe Zhou  To: Shayna Miramontes MD  Sent: 1/4/2023 10:47 AM CST  Subject: Question regarding VITAMIN D, 25-HYDROXY    Hi Dr Arianne Ureña,  Would you recommend I go on the prescription once a week Vitamin D for a level this low? Thanks!

## 2023-01-04 NOTE — TELEPHONE ENCOUNTER
Dr. Sharan Gaytan: see patient message regarding vitamin D level.    Component      Latest Ref Rng & Units 12/20/2022   VITAMIN D, 25-OH, TOTAL      30.0 - 100.0 ng/mL 17.6 (L)

## 2023-01-05 RX ORDER — ERGOCALCIFEROL 1.25 MG/1
50000 CAPSULE ORAL WEEKLY
Qty: 12 CAPSULE | Refills: 0 | Status: SHIPPED | OUTPATIENT
Start: 2023-01-05 | End: 2023-04-05

## 2023-01-10 ENCOUNTER — TELEPHONE (OUTPATIENT)
Dept: RHEUMATOLOGY | Facility: CLINIC | Age: 43
End: 2023-01-10

## 2023-01-10 NOTE — TELEPHONE ENCOUNTER
Left message to call office back. Monica Troncoso  12/20/2022                    Plan to start taltz at beginning of the year with new insurance - it's on the formulary per pt. - start taltz 160mg once , then 80mg every 4 weeks- will need PA                            PA request started via [employer], waiting for form to upload.

## 2023-01-11 NOTE — TELEPHONE ENCOUNTER
[facility] contacted at [phone]. Spoke with Merline Dooms and then was transferred to [employer]. PA form printed off [employer], completed, and faxed.

## 2023-01-11 NOTE — TELEPHONE ENCOUNTER
Left message to up load insurance cart via My Chart office need pharmacy benefit information to start PA for Brissa Vilchis.

## 2023-01-13 NOTE — TELEPHONE ENCOUNTER
PA denied for Taltz per plan alternatives for step therapy are Infliximab, Cimzia, Simponi, or Tharon Rakan. Please advise.

## 2023-01-21 RX ORDER — DICLOFENAC SODIUM 75 MG/1
75 TABLET, DELAYED RELEASE ORAL 2 TIMES DAILY
Qty: 60 TABLET | Refills: 3 | Status: SHIPPED | OUTPATIENT
Start: 2023-01-21

## 2023-01-21 RX ORDER — ERGOCALCIFEROL 1.25 MG/1
50000 CAPSULE ORAL WEEKLY
Qty: 12 CAPSULE | Refills: 0 | Status: SHIPPED | OUTPATIENT
Start: 2023-01-21 | End: 2023-04-21

## 2023-01-30 RX ORDER — CERTOLIZUMAB PEGOL 400 MG
400 KIT SUBCUTANEOUS
Qty: 1 KIT | Refills: 5 | Status: SHIPPED | OUTPATIENT
Start: 2023-01-30

## 2023-01-30 RX ORDER — CERTOLIZUMAB PEGOL 200 MG/ML
400 INJECTION, SOLUTION SUBCUTANEOUS
Qty: 6 EACH | Refills: 0 | Status: SHIPPED | OUTPATIENT
Start: 2023-01-30 | End: 2023-02-28

## 2023-01-30 NOTE — TELEPHONE ENCOUNTER
Scripts pended to pharmacy. Does pt need teaching.        Prior authorization for: Rennis Dance    Medication form:PFS    Date received: 1/19/2023    Approval #:    Approved dates: 1/19/2023 to 1/19/2024    Pharmacy for medication: [facility]

## 2023-01-30 NOTE — TELEPHONE ENCOUNTER
Left message Haritha Cooper approved and script sent to the pharmacy. Medication has a loading dose then maintenance. Call office if you would like to schedule a teaching.

## 2023-02-07 RX ORDER — LEVOTHYROXINE SODIUM 0.05 MG/1
50 TABLET ORAL
Qty: 90 TABLET | Refills: 0 | Status: SHIPPED | OUTPATIENT
Start: 2023-02-07

## 2023-02-07 NOTE — TELEPHONE ENCOUNTER
LOV: 05/20/22    RTC:PRN    F/U:No FU      Pending Monthly Supply:orders pending, please approve if agreeable.

## 2023-02-08 ENCOUNTER — PATIENT MESSAGE (OUTPATIENT)
Dept: RHEUMATOLOGY | Facility: CLINIC | Age: 43
End: 2023-02-08

## 2023-02-09 NOTE — TELEPHONE ENCOUNTER
From: Anita Cordova  To: Emilia Oconnor MD  Sent: 2/8/2023 11:52 AM CST  Subject: Cimzia    Good morning,  I am attempting to get my first prescription filled for Cimzia through [facility] [address] [place] [address]. They have told me that the prior authorization form/number needs to be called in to [phone]. Please let me know if you need any additional information from me.  Thank you

## 2023-02-09 NOTE — TELEPHONE ENCOUNTER
Called and spoke with pharmacy. They stated only the 30 day maintenance dose is approved. No authorization for the loading dose. Started PA for loading dose.

## 2023-02-16 ENCOUNTER — OFFICE VISIT (OUTPATIENT)
Dept: ENDOCRINOLOGY CLINIC | Facility: CLINIC | Age: 43
End: 2023-02-16

## 2023-02-16 VITALS
BODY MASS INDEX: 36 KG/M2 | HEART RATE: 77 BPM | DIASTOLIC BLOOD PRESSURE: 65 MMHG | WEIGHT: 193 LBS | SYSTOLIC BLOOD PRESSURE: 101 MMHG

## 2023-02-16 DIAGNOSIS — E03.8 HYPOTHYROIDISM DUE TO HASHIMOTO'S THYROIDITIS: Primary | ICD-10-CM

## 2023-02-16 DIAGNOSIS — E06.3 HYPOTHYROIDISM DUE TO HASHIMOTO'S THYROIDITIS: Primary | ICD-10-CM

## 2023-02-16 PROCEDURE — 3074F SYST BP LT 130 MM HG: CPT | Performed by: INTERNAL MEDICINE

## 2023-02-16 PROCEDURE — 99213 OFFICE O/P EST LOW 20 MIN: CPT | Performed by: INTERNAL MEDICINE

## 2023-02-16 PROCEDURE — 3078F DIAST BP <80 MM HG: CPT | Performed by: INTERNAL MEDICINE

## 2023-03-16 ENCOUNTER — TELEPHONE (OUTPATIENT)
Dept: RHEUMATOLOGY | Facility: CLINIC | Age: 43
End: 2023-03-16

## 2023-03-16 NOTE — TELEPHONE ENCOUNTER
Hodan Keating , pharmacy technician from [facility] is requesting clarification on the [facility] medication. Dose form clarification requested.   REF #[id number]    Call back # [phone]    FAX #[phone]

## 2023-03-22 RX ORDER — ERGOCALCIFEROL 1.25 MG/1
CAPSULE ORAL
Qty: 12 CAPSULE | Refills: 0 | Status: SHIPPED | OUTPATIENT
Start: 2023-03-22

## 2023-04-14 ENCOUNTER — LAB ENCOUNTER (OUTPATIENT)
Dept: LAB | Facility: HOSPITAL | Age: 43
End: 2023-04-14
Attending: INTERNAL MEDICINE
Payer: COMMERCIAL

## 2023-04-14 DIAGNOSIS — Z51.81 ENCOUNTER FOR THERAPEUTIC DRUG MONITORING: Primary | ICD-10-CM

## 2023-04-14 DIAGNOSIS — F31.81 BIPOLAR II DISORDER (HCC): ICD-10-CM

## 2023-04-14 DIAGNOSIS — E03.8 HYPOTHYROIDISM DUE TO HASHIMOTO'S THYROIDITIS: ICD-10-CM

## 2023-04-14 DIAGNOSIS — E06.3 HYPOTHYROIDISM DUE TO HASHIMOTO'S THYROIDITIS: ICD-10-CM

## 2023-04-14 LAB
ALBUMIN SERPL-MCNC: 3.9 G/DL (ref 3.4–5)
ALBUMIN/GLOB SERPL: 1.1 {RATIO} (ref 1–2)
ALP LIVER SERPL-CCNC: 99 U/L
ALT SERPL-CCNC: 46 U/L
ANION GAP SERPL CALC-SCNC: 6 MMOL/L (ref 0–18)
AST SERPL-CCNC: 23 U/L (ref 15–37)
BASOPHILS # BLD AUTO: 0.03 X10(3) UL (ref 0–0.2)
BASOPHILS NFR BLD AUTO: 0.4 %
BILIRUB SERPL-MCNC: 0.4 MG/DL (ref 0.1–2)
BUN BLD-MCNC: 9 MG/DL (ref 7–18)
BUN/CREAT SERPL: 11.5 (ref 10–20)
CALCIUM BLD-MCNC: 9.1 MG/DL (ref 8.5–10.1)
CHLORIDE SERPL-SCNC: 108 MMOL/L (ref 98–112)
CHOLEST SERPL-MCNC: 234 MG/DL (ref ?–200)
CO2 SERPL-SCNC: 25 MMOL/L (ref 21–32)
CREAT BLD-MCNC: 0.78 MG/DL
DEPRECATED RDW RBC AUTO: 38 FL (ref 35.1–46.3)
EOSINOPHIL # BLD AUTO: 0.09 X10(3) UL (ref 0–0.7)
EOSINOPHIL NFR BLD AUTO: 1.3 %
ERYTHROCYTE [DISTWIDTH] IN BLOOD BY AUTOMATED COUNT: 11.9 % (ref 11–15)
FASTING PATIENT LIPID ANSWER: YES
FASTING STATUS PATIENT QL REPORTED: YES
GFR SERPLBLD BASED ON 1.73 SQ M-ARVRAT: 98 ML/MIN/1.73M2 (ref 60–?)
GLOBULIN PLAS-MCNC: 3.6 G/DL (ref 2.8–4.4)
GLUCOSE BLD-MCNC: 93 MG/DL (ref 70–99)
HCT VFR BLD AUTO: 37.9 %
HDLC SERPL-MCNC: 41 MG/DL (ref 40–59)
HGB BLD-MCNC: 12.8 G/DL
IMM GRANULOCYTES # BLD AUTO: 0.03 X10(3) UL (ref 0–1)
IMM GRANULOCYTES NFR BLD: 0.4 %
LDLC SERPL CALC-MCNC: 159 MG/DL (ref ?–100)
LYMPHOCYTES # BLD AUTO: 2.74 X10(3) UL (ref 1–4)
LYMPHOCYTES NFR BLD AUTO: 38.8 %
MCH RBC QN AUTO: 29.4 PG (ref 26–34)
MCHC RBC AUTO-ENTMCNC: 33.8 G/DL (ref 31–37)
MCV RBC AUTO: 87.1 FL
MONOCYTES # BLD AUTO: 0.4 X10(3) UL (ref 0.1–1)
MONOCYTES NFR BLD AUTO: 5.7 %
NEUTROPHILS # BLD AUTO: 3.77 X10 (3) UL (ref 1.5–7.7)
NEUTROPHILS # BLD AUTO: 3.77 X10(3) UL (ref 1.5–7.7)
NEUTROPHILS NFR BLD AUTO: 53.4 %
NONHDLC SERPL-MCNC: 193 MG/DL (ref ?–130)
OSMOLALITY SERPL CALC.SUM OF ELEC: 286 MOSM/KG (ref 275–295)
PLATELET # BLD AUTO: 366 10(3)UL (ref 150–450)
POTASSIUM SERPL-SCNC: 3.6 MMOL/L (ref 3.5–5.1)
PROT SERPL-MCNC: 7.5 G/DL (ref 6.4–8.2)
RBC # BLD AUTO: 4.35 X10(6)UL
SODIUM SERPL-SCNC: 139 MMOL/L (ref 136–145)
T3FREE SERPL-MCNC: 2.33 PG/ML (ref 2.4–4.2)
T4 FREE SERPL-MCNC: 1.3 NG/DL (ref 0.8–1.7)
TRIGL SERPL-MCNC: 183 MG/DL (ref 30–149)
TSI SER-ACNC: 1.68 MIU/ML (ref 0.36–3.74)
VLDLC SERPL CALC-MCNC: 36 MG/DL (ref 0–30)
WBC # BLD AUTO: 7.1 X10(3) UL (ref 4–11)

## 2023-04-14 PROCEDURE — 85025 COMPLETE CBC W/AUTO DIFF WBC: CPT

## 2023-04-14 PROCEDURE — 84439 ASSAY OF FREE THYROXINE: CPT

## 2023-04-14 PROCEDURE — 84443 ASSAY THYROID STIM HORMONE: CPT

## 2023-04-14 PROCEDURE — 80175 DRUG SCREEN QUAN LAMOTRIGINE: CPT

## 2023-04-14 PROCEDURE — 80053 COMPREHEN METABOLIC PANEL: CPT

## 2023-04-14 PROCEDURE — 80061 LIPID PANEL: CPT

## 2023-04-14 PROCEDURE — 84481 FREE ASSAY (FT-3): CPT

## 2023-04-14 PROCEDURE — 36415 COLL VENOUS BLD VENIPUNCTURE: CPT

## 2023-04-17 ENCOUNTER — TELEPHONE (OUTPATIENT)
Dept: ENDOCRINOLOGY CLINIC | Facility: CLINIC | Age: 43
End: 2023-04-17

## 2023-04-17 RX ORDER — SEMAGLUTIDE 0.25 MG/.5ML
0.25 INJECTION, SOLUTION SUBCUTANEOUS WEEKLY
Qty: 2 ML | Refills: 0 | Status: SHIPPED | OUTPATIENT
Start: 2023-04-17

## 2023-04-17 RX ORDER — SEMAGLUTIDE 0.5 MG/.5ML
0.5 INJECTION, SOLUTION SUBCUTANEOUS WEEKLY
Qty: 4 EACH | Refills: 0 | Status: SHIPPED | OUTPATIENT
Start: 2023-05-17

## 2023-04-18 LAB — LAMOTRIGINE LVL: 3.1 UG/ML

## 2023-06-02 NOTE — ED PROVIDER NOTES
Patient Seen in: [facility] AND [facility] Emergency Department    History   CC: palpitations   HPI: Devere  30year old female w/ hx anxiety, depression, hyperlipidemia, asthma, GERD, kidney stones who presents to the ER c/o history of intermittent palpita Hypertension Mother    • Depression Mother    • Other Lily Canny Mother      COPD   • Lipids Maternal Grandmother      Hyperlipidemia   • Lipids Maternal Grandfather      Hyperlipidemia   • Lipids Paternal Grandmother      Hyperlipidemia   • Diabetes Paternal NAD  Head - Appears symmetrical without deformity/swelling cranium, scalp, or facial bones  Eyes - PERRL, sclera not injected, no discharge noted, no periorbital edema  ENT - EAC bilaterally without discharge, TM pearly grey with COL visualized appropriate view results for these tests on the individual orders. RAINBOW DRAW BLUE   RAINBOW DRAW LAVENDER   RAINBOW DRAW DARK GREEN   RAINBOW DRAW LIGHT GREEN   RAINBOW DRAW GOLD   RAINBOW DRAW LAVENDER TALL (BNP)       MDM     Blood counts within normal limits. PACU

## 2023-06-15 RX ORDER — DICLOFENAC SODIUM 75 MG/1
TABLET, DELAYED RELEASE ORAL
Qty: 60 TABLET | Refills: 3 | OUTPATIENT
Start: 2023-06-15

## 2023-06-15 NOTE — TELEPHONE ENCOUNTER
Medication Quantity Refills Start End   diclofenac 75 MG Oral Tab  tablet 1 6/12/2023      Recently filled.

## 2023-06-30 ENCOUNTER — OFFICE VISIT (OUTPATIENT)
Dept: RHEUMATOLOGY | Facility: CLINIC | Age: 43
End: 2023-06-30

## 2023-06-30 VITALS
RESPIRATION RATE: 16 BRPM | HEART RATE: 114 BPM | WEIGHT: 182.63 LBS | SYSTOLIC BLOOD PRESSURE: 110 MMHG | HEIGHT: 61 IN | BODY MASS INDEX: 34.48 KG/M2 | DIASTOLIC BLOOD PRESSURE: 76 MMHG

## 2023-06-30 DIAGNOSIS — Z51.81 THERAPEUTIC DRUG MONITORING: ICD-10-CM

## 2023-06-30 DIAGNOSIS — M79.10 MYALGIA: ICD-10-CM

## 2023-06-30 DIAGNOSIS — M45.9 ANKYLOSING SPONDYLITIS, UNSPECIFIED SITE OF SPINE (HCC): Primary | ICD-10-CM

## 2023-06-30 DIAGNOSIS — E55.9 VITAMIN D DEFICIENCY: ICD-10-CM

## 2023-06-30 PROCEDURE — 3008F BODY MASS INDEX DOCD: CPT | Performed by: INTERNAL MEDICINE

## 2023-06-30 PROCEDURE — 3078F DIAST BP <80 MM HG: CPT | Performed by: INTERNAL MEDICINE

## 2023-06-30 PROCEDURE — 3074F SYST BP LT 130 MM HG: CPT | Performed by: INTERNAL MEDICINE

## 2023-06-30 PROCEDURE — 99214 OFFICE O/P EST MOD 30 MIN: CPT | Performed by: INTERNAL MEDICINE

## 2023-06-30 RX ORDER — METHYLPREDNISOLONE 4 MG/1
TABLET ORAL
Qty: 1 EACH | Refills: 0 | Status: SHIPPED | OUTPATIENT
Start: 2023-06-30

## 2023-06-30 RX ORDER — CERTOLIZUMAB PEGOL 400 MG
400 KIT SUBCUTANEOUS
Qty: 1 KIT | Refills: 5 | Status: SHIPPED | OUTPATIENT
Start: 2023-06-30

## 2023-06-30 RX ORDER — DULOXETIN HYDROCHLORIDE 30 MG/1
30 CAPSULE, DELAYED RELEASE ORAL
COMMUNITY
Start: 2023-06-27

## 2023-06-30 RX ORDER — DULOXETIN HYDROCHLORIDE 60 MG/1
60 CAPSULE, DELAYED RELEASE ORAL DAILY
COMMUNITY
Start: 2023-06-17

## 2023-06-30 RX ORDER — CYCLOBENZAPRINE HCL 10 MG
5 TABLET ORAL AS NEEDED
COMMUNITY
Start: 2023-05-03

## 2023-07-17 RX ORDER — LEVOTHYROXINE SODIUM 0.05 MG/1
50 TABLET ORAL
Qty: 90 TABLET | Refills: 1 | Status: SHIPPED | OUTPATIENT
Start: 2023-07-17

## 2023-07-17 NOTE — TELEPHONE ENCOUNTER
LOV: 2/16/23    RTC: 6 Months    FU: No FU Appt Scheduled    3 Month Supply Pending    Called pt to schedule fu, LMTCB

## 2023-09-02 RX ORDER — METHYLPREDNISOLONE 4 MG/1
TABLET ORAL
Qty: 1 EACH | Refills: 0 | Status: SHIPPED | OUTPATIENT
Start: 2023-09-02

## 2023-10-04 RX ORDER — DICLOFENAC SODIUM 75 MG/1
75 TABLET, DELAYED RELEASE ORAL 2 TIMES DAILY
Qty: 12 TABLET | Refills: 9 | OUTPATIENT
Start: 2023-10-04

## 2023-10-04 RX ORDER — DICLOFENAC SODIUM 75 MG/1
75 TABLET, DELAYED RELEASE ORAL 2 TIMES DAILY
Qty: 180 TABLET | Refills: 1 | Status: SHIPPED | OUTPATIENT
Start: 2023-10-04

## 2023-10-04 NOTE — TELEPHONE ENCOUNTER
LOV: 6/30/23  Last Refilled:#180, 1rf 6/12/23    Future Appointments   Date Time Provider Alexander Tadeo   12/1/2023  9:00 AM Charolett Simmonds, MD [facility]     Summary:  1. Cont. Cimzia 400mg a month  2. Cont. diclofenac 75mg twice a day  3. Plan for neck injections -   4. .  Medrol radha  take with klonipin prn   3. meditterean diet and plant based diet   4. omega3 supplement. 5. Tumeric supplement  6. Check labs   7. Return to clinic in 3-6  months. In the past . PT for lower back - left side - also consider  marybeth chi or yoga or heated pool stretching   And she has to  Fax TB report to us - [phone] Oleksandr Alvarez MD  6/30/2023   11:34 AM  Please advise.

## 2023-11-27 ENCOUNTER — OFFICE VISIT (OUTPATIENT)
Dept: INTERNAL MEDICINE CLINIC | Facility: CLINIC | Age: 43
End: 2023-11-27
Payer: COMMERCIAL

## 2023-11-27 VITALS
HEIGHT: 61 IN | DIASTOLIC BLOOD PRESSURE: 80 MMHG | BODY MASS INDEX: 35.5 KG/M2 | SYSTOLIC BLOOD PRESSURE: 120 MMHG | WEIGHT: 188 LBS

## 2023-11-27 DIAGNOSIS — M45.0 ANKYLOSING SPONDYLITIS OF MULTIPLE SITES IN SPINE (HCC): ICD-10-CM

## 2023-11-27 DIAGNOSIS — Z00.00 WELLNESS EXAMINATION: Primary | ICD-10-CM

## 2023-11-27 DIAGNOSIS — E55.9 VITAMIN D DEFICIENCY: ICD-10-CM

## 2023-11-27 DIAGNOSIS — K21.00 GASTROESOPHAGEAL REFLUX DISEASE WITH ESOPHAGITIS WITHOUT HEMORRHAGE: ICD-10-CM

## 2023-11-27 DIAGNOSIS — Z12.31 SCREENING MAMMOGRAM, ENCOUNTER FOR: ICD-10-CM

## 2023-11-27 DIAGNOSIS — E03.9 HYPOTHYROIDISM (ACQUIRED): ICD-10-CM

## 2023-12-05 ENCOUNTER — LAB ENCOUNTER (OUTPATIENT)
Dept: LAB | Facility: HOSPITAL | Age: 43
End: 2023-12-05
Attending: INTERNAL MEDICINE
Payer: COMMERCIAL

## 2023-12-05 DIAGNOSIS — M45.9 ANKYLOSING SPONDYLITIS, UNSPECIFIED SITE OF SPINE (HCC): ICD-10-CM

## 2023-12-05 DIAGNOSIS — E55.9 VITAMIN D DEFICIENCY: ICD-10-CM

## 2023-12-05 DIAGNOSIS — Z51.81 THERAPEUTIC DRUG MONITORING: ICD-10-CM

## 2023-12-05 LAB
ALBUMIN SERPL-MCNC: 4.6 G/DL (ref 3.2–4.8)
ALBUMIN/GLOB SERPL: 1.5 {RATIO} (ref 1–2)
ALP LIVER SERPL-CCNC: 90 U/L
ALT SERPL-CCNC: 30 U/L
ANION GAP SERPL CALC-SCNC: 6 MMOL/L (ref 0–18)
AST SERPL-CCNC: 18 U/L (ref ?–34)
BASOPHILS # BLD AUTO: 0.06 X10(3) UL (ref 0–0.2)
BASOPHILS NFR BLD AUTO: 0.7 %
BILIRUB SERPL-MCNC: 0.4 MG/DL (ref 0.3–1.2)
BUN BLD-MCNC: 13 MG/DL (ref 9–23)
BUN/CREAT SERPL: 18.1 (ref 10–20)
CALCIUM BLD-MCNC: 9.6 MG/DL (ref 8.7–10.4)
CHLORIDE SERPL-SCNC: 106 MMOL/L (ref 98–112)
CHOLEST SERPL-MCNC: 258 MG/DL (ref ?–200)
CO2 SERPL-SCNC: 25 MMOL/L (ref 21–32)
CREAT BLD-MCNC: 0.72 MG/DL
CRP SERPL-MCNC: <0.4 MG/DL (ref ?–1)
DEPRECATED RDW RBC AUTO: 37.5 FL (ref 35.1–46.3)
EGFRCR SERPLBLD CKD-EPI 2021: 108 ML/MIN/1.73M2 (ref 60–?)
EOSINOPHIL # BLD AUTO: 0.38 X10(3) UL (ref 0–0.7)
EOSINOPHIL NFR BLD AUTO: 4.4 %
ERYTHROCYTE [DISTWIDTH] IN BLOOD BY AUTOMATED COUNT: 12 % (ref 11–15)
ERYTHROCYTE [SEDIMENTATION RATE] IN BLOOD: 17 MM/HR
FASTING PATIENT LIPID ANSWER: YES
FASTING STATUS PATIENT QL REPORTED: YES
GLOBULIN PLAS-MCNC: 3 G/DL (ref 2.8–4.4)
GLUCOSE BLD-MCNC: 100 MG/DL (ref 70–99)
HCT VFR BLD AUTO: 38.6 %
HDLC SERPL-MCNC: 46 MG/DL (ref 40–59)
HGB BLD-MCNC: 13.3 G/DL
IMM GRANULOCYTES # BLD AUTO: 0.06 X10(3) UL (ref 0–1)
IMM GRANULOCYTES NFR BLD: 0.7 %
LDLC SERPL CALC-MCNC: 162 MG/DL (ref ?–100)
LYMPHOCYTES # BLD AUTO: 3.37 X10(3) UL (ref 1–4)
LYMPHOCYTES NFR BLD AUTO: 39.5 %
MCH RBC QN AUTO: 29.6 PG (ref 26–34)
MCHC RBC AUTO-ENTMCNC: 34.5 G/DL (ref 31–37)
MCV RBC AUTO: 86 FL
MONOCYTES # BLD AUTO: 0.54 X10(3) UL (ref 0.1–1)
MONOCYTES NFR BLD AUTO: 6.3 %
NEUTROPHILS # BLD AUTO: 4.13 X10 (3) UL (ref 1.5–7.7)
NEUTROPHILS # BLD AUTO: 4.13 X10(3) UL (ref 1.5–7.7)
NEUTROPHILS NFR BLD AUTO: 48.4 %
NONHDLC SERPL-MCNC: 212 MG/DL (ref ?–130)
OSMOLALITY SERPL CALC.SUM OF ELEC: 284 MOSM/KG (ref 275–295)
PLATELET # BLD AUTO: 348 10(3)UL (ref 150–450)
POTASSIUM SERPL-SCNC: 4.5 MMOL/L (ref 3.5–5.1)
PROT SERPL-MCNC: 7.6 G/DL (ref 5.7–8.2)
RBC # BLD AUTO: 4.49 X10(6)UL
SODIUM SERPL-SCNC: 137 MMOL/L (ref 136–145)
T4 FREE SERPL-MCNC: 1.1 NG/DL (ref 0.8–1.7)
TRIGL SERPL-MCNC: 270 MG/DL (ref 30–149)
TSI SER-ACNC: 1.47 MIU/ML (ref 0.55–4.78)
VIT D+METAB SERPL-MCNC: 17.4 NG/ML (ref 30–100)
VLDLC SERPL CALC-MCNC: 54 MG/DL (ref 0–30)
WBC # BLD AUTO: 8.5 X10(3) UL (ref 4–11)

## 2023-12-05 PROCEDURE — 86140 C-REACTIVE PROTEIN: CPT

## 2023-12-05 PROCEDURE — 85025 COMPLETE CBC W/AUTO DIFF WBC: CPT | Performed by: INTERNAL MEDICINE

## 2023-12-05 PROCEDURE — 84439 ASSAY OF FREE THYROXINE: CPT | Performed by: INTERNAL MEDICINE

## 2023-12-05 PROCEDURE — 82306 VITAMIN D 25 HYDROXY: CPT

## 2023-12-05 PROCEDURE — 36415 COLL VENOUS BLD VENIPUNCTURE: CPT

## 2023-12-05 PROCEDURE — 80053 COMPREHEN METABOLIC PANEL: CPT | Performed by: INTERNAL MEDICINE

## 2023-12-05 PROCEDURE — 85652 RBC SED RATE AUTOMATED: CPT

## 2023-12-05 PROCEDURE — 80061 LIPID PANEL: CPT | Performed by: INTERNAL MEDICINE

## 2023-12-05 PROCEDURE — 84443 ASSAY THYROID STIM HORMONE: CPT | Performed by: INTERNAL MEDICINE

## 2023-12-07 ENCOUNTER — HOSPITAL ENCOUNTER (OUTPATIENT)
Age: 43
Discharge: HOME | End: 2023-12-07
Payer: COMMERCIAL

## 2023-12-07 ENCOUNTER — APPOINTMENT (OUTPATIENT)
Dept: GENERAL RADIOLOGY | Age: 43
End: 2023-12-07
Attending: PHYSICIAN ASSISTANT
Payer: COMMERCIAL

## 2023-12-07 VITALS
BODY MASS INDEX: 35.5 KG/M2 | HEIGHT: 61 IN | HEART RATE: 96 BPM | RESPIRATION RATE: 20 BRPM | SYSTOLIC BLOOD PRESSURE: 128 MMHG | DIASTOLIC BLOOD PRESSURE: 77 MMHG | TEMPERATURE: 98 F | OXYGEN SATURATION: 98 % | WEIGHT: 188 LBS

## 2023-12-07 DIAGNOSIS — R05.1 ACUTE COUGH: Primary | ICD-10-CM

## 2023-12-07 DIAGNOSIS — U07.1 COVID-19: ICD-10-CM

## 2023-12-07 DIAGNOSIS — J45.901 EXACERBATION OF ASTHMA, UNSPECIFIED ASTHMA SEVERITY, UNSPECIFIED WHETHER PERSISTENT: ICD-10-CM

## 2023-12-07 PROCEDURE — 71046 X-RAY EXAM CHEST 2 VIEWS: CPT | Performed by: PHYSICIAN ASSISTANT

## 2023-12-07 RX ORDER — MOLNUPIRAVIR 200 MG/1
800 CAPSULE ORAL EVERY 12 HOURS
Qty: 40 CAPSULE | Refills: 0 | Status: SHIPPED | OUTPATIENT
Start: 2023-12-07 | End: 2023-12-12

## 2023-12-07 RX ORDER — ALBUTEROL SULFATE 90 UG/1
2 AEROSOL, METERED RESPIRATORY (INHALATION) EVERY 4 HOURS PRN
Qty: 1 EACH | Refills: 0 | Status: SHIPPED | OUTPATIENT
Start: 2023-12-07 | End: 2024-01-06

## 2023-12-07 RX ORDER — PREDNISONE 20 MG/1
40 TABLET ORAL DAILY
Qty: 10 TABLET | Refills: 0 | Status: SHIPPED | OUTPATIENT
Start: 2023-12-07 | End: 2023-12-12

## 2023-12-10 RX ORDER — CERTOLIZUMAB PEGOL 200 MG/ML
400 INJECTION, SOLUTION SUBCUTANEOUS
Qty: 1 EACH | Refills: 5 | Status: SHIPPED | OUTPATIENT
Start: 2023-12-10

## 2024-01-12 ENCOUNTER — TELEPHONE (OUTPATIENT)
Dept: RHEUMATOLOGY | Facility: CLINIC | Age: 44
End: 2024-01-12

## 2024-01-12 ENCOUNTER — TELEMEDICINE (OUTPATIENT)
Dept: RHEUMATOLOGY | Facility: CLINIC | Age: 44
End: 2024-01-12
Payer: COMMERCIAL

## 2024-01-12 DIAGNOSIS — M45.9 ANKYLOSING SPONDYLITIS, UNSPECIFIED SITE OF SPINE (HCC): Primary | ICD-10-CM

## 2024-01-12 DIAGNOSIS — Z51.81 THERAPEUTIC DRUG MONITORING: ICD-10-CM

## 2024-01-12 PROCEDURE — 99214 OFFICE O/P EST MOD 30 MIN: CPT | Performed by: INTERNAL MEDICINE

## 2024-01-12 RX ORDER — METHYLPREDNISOLONE 4 MG/1
TABLET ORAL
Qty: 1 EACH | Refills: 0 | Status: SHIPPED | OUTPATIENT
Start: 2024-01-12
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
## 2024-01-12 NOTE — PROGRESS NOTES
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 interactive audio and/or video communication.  This was done with patient consent.   This has been done in good emily to provide continuity of care in the best interest of the provider-patient relationship, due to the ongoing public health crisis/national emergency and because of restrictions of visitation.  There are limitations of this visit as no physical exam could be performed.  Every conscious effort was taken to allow for sufficient and adequate time.  This billing was spent on reviewing labs, medications, radiology tests and decision making.  Appropriate medical decision-making and tests are ordered as detailed in the plan of care above

## 2024-01-12 NOTE — PATIENT INSTRUCTIONS
1. Cont. Cimzia 400mg a month  2. Cont. diclofenac 75mg twice a day  3. Medrol radha  - for pain take with klonipin prn   4. omega3 supplement.   5. Tumeric supplement  6. Check TB tests prior to next appt.   7. Return to clinic in 3 -4 months -

## 2024-03-04 RX ORDER — DICLOFENAC SODIUM 75 MG/1
75 TABLET, DELAYED RELEASE ORAL 2 TIMES DAILY
Qty: 180 TABLET | Refills: 1 | Status: SHIPPED | OUTPATIENT
Start: 2024-03-04

## 2024-03-04 NOTE — TELEPHONE ENCOUNTER
Requested Prescriptions     Pending Prescriptions Disp Refills    DICLOFENAC 75 MG Oral Tab EC [Pharmacy Med Name: DICLOFENAC SOD EC 75 MG TAB] 180 tablet 1     Sig: TAKE 1 TABLET BY MOUTH TWICE A DAY     Future Appointments   Date Time Provider Department Center   3/9/2024 10:00 AM [facility] RM1 [facility] EM [facility]     LOV: 1/12/24 Telemedicine   Last Refilled:10/4/2023 #180 1RF  Labs:  Component      Latest Ref Rng 12/5/2023   SED RATE      0 - 20 mm/Hr 17    C-REACTIVE PROTEIN      <1.00 mg/dL <0.40    VITAMIN D, 25-OH, TOTAL      30.0 - 100.0 ng/mL 17.4 (L)       Legend:  (L) Low      Summary:  1. Cont. Cimzia 400mg a month  2. Cont. diclofenac 75mg twice a day  3. Medrol radha  - for pain take with klonipin prn   4. omega3 supplement.   5. Tumeric supplement  6. Check TB tests prior to next appt.   7. Return to clinic in 3 -4 months -            In the past . PT for lower back - left side - also consider  marybeth chi or yoga or heated pool stretching   And she has to  Fax TB report to us - [phone] -      Rj Doe MD  1/12/2024   11:54 AM  - [place]ed [place]  through [employer]

## 2024-03-05 ENCOUNTER — TELEPHONE (OUTPATIENT)
Dept: RHEUMATOLOGY | Facility: CLINIC | Age: 44
End: 2024-03-05

## 2024-03-05 NOTE — TELEPHONE ENCOUNTER
PA start    Prior authorization for: Cimzia    Medication form: PFS kit    Submission method: fax to [facility]    Spoke with (if by phone):     Date submitted: 3/5/2024    Tracking #: [id number]    QF-TB result: Ordered 1/12/2024    Component      Latest Ref Rng 12/20/2022   Quantiferon TB NIL      IU/mL 0.01    Quantiferon-TB1 Minus NIL      IU/mL 0.02    Quantiferon-TB2 Minus NIL      IU/mL 0.02    Quantiferon TB Mitogen minus NIL      IU/mL >10.00    Quantiferon TB Result      Negative  Negative

## 2024-03-09 ENCOUNTER — HOSPITAL ENCOUNTER (OUTPATIENT)
Dept: MAMMOGRAPHY | Facility: HOSPITAL | Age: 44
Discharge: HOME | End: 2024-03-09
Attending: INTERNAL MEDICINE
Payer: COMMERCIAL

## 2024-03-09 DIAGNOSIS — Z12.31 SCREENING MAMMOGRAM, ENCOUNTER FOR: ICD-10-CM

## 2024-03-09 PROCEDURE — 77067 SCR MAMMO BI INCL CAD: CPT | Performed by: INTERNAL MEDICINE

## 2024-03-09 PROCEDURE — 77063 BREAST TOMOSYNTHESIS BI: CPT | Performed by: INTERNAL MEDICINE

## 2024-03-11 RX ORDER — CERTOLIZUMAB PEGOL 200 MG/ML
400 INJECTION, SOLUTION SUBCUTANEOUS
Qty: 1 EACH | Refills: 5 | Status: CANCELLED | OUTPATIENT
Start: 2024-03-11

## 2024-03-11 NOTE — TELEPHONE ENCOUNTER
PA Approved for Renewal - Script pended; routed for MD review and signature.     Prior authorization for: Cimzia    Medication form: 400mg syringe    Date received: 3/11/2024    Approval #: [id number]    Approved dates: 02/04/2024 thru 03/11/2025    Pharmacy for medication: [facility]    QF-TB result: Ordered 1/12/2024     Component      Latest Ref Rng 12/20/2022   Quantiferon TB NIL      IU/mL 0.01    Quantiferon-TB1 Minus NIL      IU/mL 0.02    Quantiferon-TB2 Minus NIL      IU/mL 0.02    Quantiferon TB Mitogen minus NIL      IU/mL >10.00    Quantiferon TB Result      Negative  Negative

## 2024-04-08 RX ORDER — SUMATRIPTAN 50 MG/1
50 TABLET, FILM COATED ORAL EVERY 2 HOUR PRN
Qty: 10 TABLET | Refills: 3 | Status: SHIPPED | OUTPATIENT
Start: 2024-04-08

## 2024-04-11 ENCOUNTER — OFFICE VISIT (OUTPATIENT)
Dept: FAMILY MEDICINE CLINIC | Facility: CLINIC | Age: 44
End: 2024-04-11
Payer: COMMERCIAL

## 2024-04-11 VITALS
WEIGHT: 180 LBS | RESPIRATION RATE: 16 BRPM | SYSTOLIC BLOOD PRESSURE: 122 MMHG | HEART RATE: 97 BPM | TEMPERATURE: 98 F | OXYGEN SATURATION: 99 % | HEIGHT: 61 IN | BODY MASS INDEX: 33.99 KG/M2 | DIASTOLIC BLOOD PRESSURE: 82 MMHG

## 2024-04-11 DIAGNOSIS — B02.9 HERPES ZOSTER WITHOUT COMPLICATION: Primary | ICD-10-CM

## 2024-04-11 PROCEDURE — 99213 OFFICE O/P EST LOW 20 MIN: CPT | Performed by: PHYSICIAN ASSISTANT

## 2024-04-11 RX ORDER — LAMOTRIGINE 150 MG/1
300 TABLET ORAL DAILY
COMMUNITY
Start: 2024-01-09

## 2024-04-11 RX ORDER — LEVOMILNACIPRAN HYDROCHLORIDE 40 MG/1
CAPSULE, EXTENDED RELEASE ORAL
COMMUNITY
Start: 2024-04-05

## 2024-04-11 RX ORDER — MIRTAZAPINE 7.5 MG/1
7.5 TABLET, FILM COATED ORAL NIGHTLY
COMMUNITY
Start: 2024-02-06 | End: 2024-04-11

## 2024-04-11 RX ORDER — VALACYCLOVIR HYDROCHLORIDE 1 G/1
1000 TABLET, FILM COATED ORAL 3 TIMES DAILY
Qty: 21 TABLET | Refills: 0 | Status: SHIPPED | OUTPATIENT
Start: 2024-04-11 | End: 2024-04-18

## 2024-04-11 NOTE — PROGRESS NOTES
CHIEF COMPLAINT:     Chief Complaint   Patient presents with    Rash     X Tuesday noticed itch to L side, bra line. Noticed rash to back, side and under L breast, bumps. Burning pain x yesterday, occ throbbing pain to L shoulder blade          HPI:    Lupe Beatty is a 43 year old female who presents for evaluation of a rash.  Per patient rash started in the past 2 days. Rash has been worsening since onset.  Patient never had similar rash in the past. The rash is characterized by areas of redness with blisters. The affected locations include left side of trunk, in bra line.. Patient has treated rash with nothing.  Associated symptoms include: no pain, + itching, + tingling, and burning.  Exposure: no new detergents, soap, personal care products or chemicals.  Has history of chicken pox.       Current Outpatient Medications   Medication Sig Dispense Refill    FETZIMA 40 MG Oral Capsule SR 24 Hr       lamoTRIgine 150 MG Oral Tab Take 2 tablets (300 mg total) by mouth daily.      diclofenac 75 MG Oral Tab EC Take 1 tablet (75 mg total) by mouth 2 (two) times daily. 180 tablet 1    levothyroxine 50 MCG Oral Tab Take 1 tablet (50 mcg total) by mouth before breakfast. 90 tablet 3    albuterol (2.5 MG/3ML) 0.083% Inhalation Nebu Soln Take 3 mL (2.5 mg total) by nebulization every 4 (four) hours as needed for Wheezing. 60 each 3    Certolizumab Pegol (CIMZIA) 2 X 200 MG/ML Subcutaneous Prefilled Syringe Kit Inject 400 mg into the skin every 30 (thirty) days. 1 each 5    ergocalciferol 1.25 MG (24408 UT) Oral Cap Take 1 capsule (50,000 Units total) by mouth once a week. 12 capsule 3    albuterol 108 (90 Base) MCG/ACT Inhalation Aero Soln Inhale 2 puffs into the lungs every 4 (four) hours as needed for Wheezing or Shortness of Breath. 1 each 0    ClonazePAM (KLONOPIN) 0.5 MG Oral Tab 0.5 tablets (0.25 mg total) 3 (three) times daily as needed (sleep, anxiety).  2    SUMAtriptan 50 MG Oral Tab Take 1 tablet (50 mg total) by  mouth every 2 (two) hours as needed for Migraine. DO NOT TAKE MORE TAKE 2 PILLS A DAY 10 tablet 3    dexlansoprazole (DEXILANT) 30 MG Oral Capsule Delayed Release Take 30 mg by mouth daily. (Patient not taking: Reported on 4/11/2024) 30 capsule 5    fluticasone propionate 50 MCG/ACT Nasal Suspension 2 sprays by Each Nare route nightly. Point up and out. (Patient not taking: Reported on 4/11/2024) 1 each 0      Past Medical History:    Acute pelvic pain, female    Laparoscopy -diagnostic (2008)    Allergic rhinitis    Ankylosing spondylitis (HCC)    Anxiety    Anxiety state, unspecified    Asthma (HCC)    COVID    Depression    Extrinsic asthma, unspecified    GERD (gastroesophageal reflux disease)    History of nephrolithiasis    Hx of sinus surgery    Hyperlipidemia    Hypothyroidism    IBS (irritable bowel syndrome)    Lipid screening    per NG      Past Surgical History:   Procedure Laterality Date    Hernia surgery  2007    Knee arthroscopy  2007    Rotator cuff repair Left 08/12/2022    Sinus surgery        Upper gi endoscopy,biopsy  2018      Family History   Problem Relation Age of Onset    Lipids Father         Hyperlipidemia    Hypertension Father     Heart Disease Father         CAD    Lipids Mother         Hyperlipidemia    Hypertension Mother     Depression Mother     Other (Other) Mother         COPD    Lipids Maternal Grandmother         Hyperlipidemia    Lipids Maternal Grandfather         Hyperlipidemia    Lipids Paternal Grandmother         Hyperlipidemia    Diabetes Paternal Grandfather     Lipids Paternal Grandfather         Hyperlipidemia      Social History     Socioeconomic History    Marital status:    Tobacco Use    Smoking status: Never    Smokeless tobacco: Never   Vaping Use    Vaping status: Never Used   Substance and Sexual Activity    Alcohol use: No     Alcohol/week: 0.0 standard drinks of alcohol    Drug use: No   Other Topics Concern    Caffeine Concern Yes     Comment:  Coffee, 1 cup daily; 1-2 cans of soda    Exercise Yes    Pt has a pacemaker No    Pt has a defibrillator No    Reaction to local anesthetic No     Social Determinants of Health      [facility]    Social Connections    [facility] from [facility], [facility]    Housing Stability         REVIEW OF SYSTEMS:   GENERAL: feels well mildly run down  SKIN: Per HPI.   HEENT: Denies rhinorrhea, edema of the lips or swelling of throat.  NEURO: See HPI.      EXAM:   /82   Pulse 97   Temp 98.4 °F (36.9 °C)   Resp 16   Ht 5' 1\" (1.549 m)   Wt 180 lb (81.6 kg)   LMP 04/03/2024 (Approximate)   SpO2 99%   BMI 34.01 kg/m²   GENERAL: well developed, well nourished,in no apparent distress  SKIN: Rash/lesion(s): erythematous maculopapular rash with vesicles to left trunk, underneath breast and wraps toward left back, inferior to scapular  in a dermatomal distribution.  Consistent with herpes zoster.   No s/s secondary infection  EYES: PERRLA, EOMI, conjunctiva are clear  HENT: Head atraumatic, normocephalic. Normal external ears bilaterally. Normal external nose. No lip edema  LUNGS: Clear to auscultation bilaterally.  No wheezing, rhonchi, or rales.  No diminished breath sounds. No increased work of breathing.   CARDIO: RRR without murmur  JOINTS: normal ROM of left arm/shoulder      ASSESSMENT AND PLAN:   Lupe Beatty is a 43 year old female who presents for evaluation of a rash.     ASSESSMENT:  Encounter Diagnosis   Name Primary?    Herpes zoster without complication Yes       PLAN:  Shingles - natural history discussed in detail including postherpetic neuralgia.  Treat with antiviral medication. Meds and instructions as listed below.    Skin care discussed with patient.     Discussed need to call PCP for severe pain at lesion sites or for any new or worsening symptoms such as excruciating pain, HA, numbness, tingling  sensation.      Meds & Refills for this Visit:  Requested Prescriptions     Signed Prescriptions Disp Refills    valACYclovir 1 G Oral Tab 21 tablet 0     Sig: Take 1 tablet (1,000 mg total) by mouth 3 (three) times daily for 7 days.         Risks, benefits, and side effects of medication explained and discussed.    The patient indicates understanding of these issues and agrees to the plan.

## 2024-08-24 ENCOUNTER — OFFICE VISIT (OUTPATIENT)
Dept: RHEUMATOLOGY | Facility: CLINIC | Age: 44
End: 2024-08-24
Payer: COMMERCIAL

## 2024-08-24 ENCOUNTER — TELEPHONE (OUTPATIENT)
Dept: RHEUMATOLOGY | Facility: CLINIC | Age: 44
End: 2024-08-24

## 2024-08-24 VITALS
HEIGHT: 61 IN | SYSTOLIC BLOOD PRESSURE: 113 MMHG | HEART RATE: 81 BPM | WEIGHT: 174 LBS | DIASTOLIC BLOOD PRESSURE: 78 MMHG | BODY MASS INDEX: 32.85 KG/M2

## 2024-08-24 DIAGNOSIS — M45.9 ANKYLOSING SPONDYLITIS, UNSPECIFIED SITE OF SPINE (HCC): Primary | ICD-10-CM

## 2024-08-24 DIAGNOSIS — Z51.81 THERAPEUTIC DRUG MONITORING: ICD-10-CM

## 2024-08-24 PROCEDURE — G2211 COMPLEX E/M VISIT ADD ON: HCPCS | Performed by: INTERNAL MEDICINE

## 2024-08-24 PROCEDURE — 99215 OFFICE O/P EST HI 40 MIN: CPT | Performed by: INTERNAL MEDICINE

## 2024-08-24 RX ORDER — DICLOFENAC SODIUM 75 MG/1
75 TABLET, DELAYED RELEASE ORAL 2 TIMES DAILY
Qty: 180 TABLET | Refills: 1 | Status: SHIPPED | OUTPATIENT
Start: 2024-08-24

## 2024-08-24 RX ORDER — VORTIOXETINE 20 MG/1
20 TABLET, FILM COATED ORAL DAILY
COMMUNITY
Start: 2024-08-13

## 2024-08-24 RX ORDER — ATOGEPANT 60 MG/1
60 TABLET ORAL DAILY
COMMUNITY
Start: 2024-07-29

## 2024-08-24 RX ORDER — UBROGEPANT 100 MG/1
100 TABLET ORAL
COMMUNITY
Start: 2024-07-29

## 2024-08-24 RX ORDER — METHYLPREDNISOLONE 4 MG
TABLET, DOSE PACK ORAL
Qty: 1 EACH | Refills: 0 | Status: SHIPPED | OUTPATIENT
Start: 2024-08-24

## 2024-08-24 NOTE — PATIENT INSTRUCTIONS
Will try taltz 160mg once and then 80mg every month   2. Cont. diclofenac 75mg twice a day  3. Medrol radha  - for pain take with klonipin prn   4. omega3 supplement.   5. Tumeric supplement  6. Check labs today  - TB tests today -   7. Return to clinic in 3 -4 months -

## 2024-08-24 NOTE — PROGRESS NOTES
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 doing ok n meloxicam but wants to try switchign. To diclofeanc 75mg bid    Chiropractic care for her neck and back have not been helping the last few months   - rtcin 3 months   - labs in 3 months.   - barely takes baclofen 5mg to 10mg at night prn   - was on naproxxen I the past.     Steroids is making her crazy - but she is willing to get her flare under control       -  S/p enbrel 50mg every week - x 3-4 months and did not respond to this    - has been on  cosenytx - started 8/2018 -   Off ibuprofen 600mg twice a day - while holiding cosentyx but she has bad reflux - so hold for now   Per pt. She was also on humira in the past in 2017 and this didn't work -with dr. Barbosa -   - check yearly quantiferon gold . 12/2020 -   - rtc in 6months     2. Fibromyalgia overlapping - with paracervical neck pain - with assoc migraines -   Too drowsy on   cyclobenzapinre 5mg at night - -   10mg at night is not as helpful  But she will try baclofena 5-10mg at night.   - off tizandine 2mg a tnight - this helped inintally but stopped working as much.   Garth chi or yoga   Got neck injectiosn - helped partially - in June and August 2023- told surgery would not help her - declined nerve ablation     3. Rotator  cuff tenodnitis of left shoudler -sx in 8/2022 ,  Cont. Post op  PT , f/u ortho for shot -   Aleve prn     4. Migraine s- f/u with dr. simmons  5. anixety derpession - cont. meds - f/u with psych and therapy - high stress even in winter 2018, on klonipin   6. Yearly tb test in 3/2019 - at [facility] - asked her to bring it in   7. Vit d def - cont. Ergocalciferol , repeat level on next labs   8. Weight gain - d/w her about diet - choices  9 righ telbow rash - follow u p - use topicals-     Summary:  Will try taltz 160mg once and then 80mg every month   2. Cont. diclofenac 75mg twice a day  3. Medrol radha  - for pain take with klonipin prn   4. omega3 supplement.   5. Tumeric supplement  6. Check TB tests today -   7. Return to clinic in 3 -4 months -     Can try for either simponi or xeljanz         In the past . PT for lower back - left side - also consider  marybeth chi or yoga or heated pool stretching   And she has to  Fax TB report to  - [phone] -     jR Doe MD  8/24/2024   10:18 AM         is applicable because the patient's medical record notes reflects the ongoing nature of the continuous longitudinal relationship of care, and the medical record indicates that there is ongoing treatment of a serious/complex medical condition.

## 2024-08-24 NOTE — TELEPHONE ENCOUNTER
Plan for taltz 160mg once and then 80mg every months for spondylits   Can we try for prior auth   Aware in 1/2023 - that she was denied for this and remicade, simponi and xeljanz was recommeded alternatives - can try for one of theses if taltz is denied

## 2024-08-29 NOTE — TELEPHONE ENCOUNTER
[facility] contacted at [phone]; transferred to [phone]. Form will be faxed to office for Taltz.

## 2024-09-10 NOTE — TELEPHONE ENCOUNTER
PA Denied    Prior authorization for:  Taltz     Medication form: 80 mg pen     Date received: 8/30/2024    Tracking #:    Denial reason: Consider treatment with SMOOTH inhibitor; Xeljanz/XR, as step therapy. If pt needs additional medications, consider adding SSZ and leflunomide to the above alternative.     Next steps: Please advise.

## 2024-09-11 ENCOUNTER — TELEPHONE (OUTPATIENT)
Dept: RHEUMATOLOGY | Facility: CLINIC | Age: 44
End: 2024-09-11

## 2024-09-11 NOTE — TELEPHONE ENCOUNTER
A representative from [facility] called regarding the following medication:    Certolizumab Pegol (CIMZIA) 2 X 200 MG/ML Subcutaneous Prefilled Syringe Kit     The representative stated that the patient has a gap in her therapy. The patient last shipment was on 03/27/2024. She stated that the patient is unclear if she needs to continue her therapy.

## 2024-09-27 NOTE — TELEPHONE ENCOUNTER
PA start    Prior authorization for: Xeljanz XR    Medication form:  11 mg tablet    Submission method: fax to - [facility]    Spoke with (if by phone):     Date submitted: 9/27/24    Tracking #:    QF-TB result: ordered 8/24/24    Component      Latest Ref Rng 12/20/2022   Quantiferon TB NIL      IU/mL 0.01    Quantiferon-TB1 Minus NIL      IU/mL 0.02    Quantiferon-TB2 Minus NIL      IU/mL 0.02    Quantiferon TB Mitogen minus NIL      IU/mL >10.00    Quantiferon TB Result      Negative  Negative

## 2024-10-03 NOTE — TELEPHONE ENCOUNTER
PA Approved    Prior authorization for: xeljanz    Medication form: 11mg    Approval #: [id number]    Approved dates: 9/30/24 - 9/30/25    Pharmacy for medication: [facility]    QF-TB results: ordered but not completed.     Dr. Plummer - did you ever receive report from patient with TB results?     Attempted to contact patient regarding TB testing

## 2024-10-04 ENCOUNTER — LAB ENCOUNTER (OUTPATIENT)
Dept: LAB | Facility: HOSPITAL | Age: 44
End: 2024-10-04
Attending: INTERNAL MEDICINE
Payer: COMMERCIAL

## 2024-10-04 LAB
ALBUMIN SERPL-MCNC: 4.4 G/DL (ref 3.2–4.8)
ALBUMIN/GLOB SERPL: 1.6 {RATIO} (ref 1–2)
ALP LIVER SERPL-CCNC: 98 U/L
ALT SERPL-CCNC: 14 U/L
ANION GAP SERPL CALC-SCNC: 7 MMOL/L (ref 0–18)
AST SERPL-CCNC: 13 U/L (ref ?–34)
BILIRUB SERPL-MCNC: 0.4 MG/DL (ref 0.3–1.2)
BUN BLD-MCNC: 12 MG/DL (ref 9–23)
BUN/CREAT SERPL: 16.9 (ref 10–20)
CALCIUM BLD-MCNC: 9.3 MG/DL (ref 8.7–10.4)
CHLORIDE SERPL-SCNC: 106 MMOL/L (ref 98–112)
CO2 SERPL-SCNC: 25 MMOL/L (ref 21–32)
CREAT BLD-MCNC: 0.71 MG/DL
CRP SERPL-MCNC: <0.4 MG/DL (ref ?–1)
DEPRECATED RDW RBC AUTO: 38.4 FL (ref 35.1–46.3)
EGFRCR SERPLBLD CKD-EPI 2021: 109 ML/MIN/1.73M2 (ref 60–?)
ERYTHROCYTE [DISTWIDTH] IN BLOOD BY AUTOMATED COUNT: 12.2 % (ref 11–15)
ERYTHROCYTE [SEDIMENTATION RATE] IN BLOOD: 7 MM/HR
FASTING STATUS PATIENT QL REPORTED: NO
GLOBULIN PLAS-MCNC: 2.8 G/DL (ref 2–3.5)
GLUCOSE BLD-MCNC: 93 MG/DL (ref 70–99)
HCT VFR BLD AUTO: 38 %
HGB BLD-MCNC: 13.2 G/DL
MCH RBC QN AUTO: 29.9 PG (ref 26–34)
MCHC RBC AUTO-ENTMCNC: 34.7 G/DL (ref 31–37)
MCV RBC AUTO: 86 FL
OSMOLALITY SERPL CALC.SUM OF ELEC: 285 MOSM/KG (ref 275–295)
PLATELET # BLD AUTO: 365 10(3)UL (ref 150–450)
POTASSIUM SERPL-SCNC: 4 MMOL/L (ref 3.5–5.1)
PROT SERPL-MCNC: 7.2 G/DL (ref 5.7–8.2)
RBC # BLD AUTO: 4.42 X10(6)UL
SODIUM SERPL-SCNC: 138 MMOL/L (ref 136–145)
WBC # BLD AUTO: 9.2 X10(3) UL (ref 4–11)

## 2024-10-04 PROCEDURE — 86140 C-REACTIVE PROTEIN: CPT | Performed by: INTERNAL MEDICINE

## 2024-10-04 PROCEDURE — 85027 COMPLETE CBC AUTOMATED: CPT | Performed by: INTERNAL MEDICINE

## 2024-10-04 PROCEDURE — 86480 TB TEST CELL IMMUN MEASURE: CPT | Performed by: INTERNAL MEDICINE

## 2024-10-04 PROCEDURE — 80053 COMPREHEN METABOLIC PANEL: CPT | Performed by: INTERNAL MEDICINE

## 2024-10-04 PROCEDURE — 85652 RBC SED RATE AUTOMATED: CPT | Performed by: INTERNAL MEDICINE

## 2024-10-04 PROCEDURE — 36415 COLL VENOUS BLD VENIPUNCTURE: CPT | Performed by: INTERNAL MEDICINE

## 2024-10-07 LAB
M TB IFN-G CD4+ T-CELLS BLD-ACNC: 0.02 IU/ML
M TB TUBERC IFN-G BLD QL: NEGATIVE
M TB TUBERC IGNF/MITOGEN IGNF CONTROL: >10 IU/ML
QFT TB1 AG MINUS NIL: 0 IU/ML
QFT TB2 AG MINUS NIL: -0.01 IU/ML

## 2024-10-14 RX ORDER — TOFACITINIB 11 MG/1
11 TABLET, FILM COATED, EXTENDED RELEASE ORAL DAILY
Qty: 30 TABLET | Refills: 5 | Status: SHIPPED | OUTPATIENT
Start: 2024-10-14

## 2024-10-14 NOTE — TELEPHONE ENCOUNTER
Script pended for review.      Component      Latest Ref Rng 10/4/2024   Quantiferon TB NIL      IU/mL 0.02    Quantiferon-TB1 Minus NIL      IU/mL 0.00    Quantiferon-TB2 Minus NIL      IU/mL -0.01    Quantiferon TB Mitogen minus NIL      IU/mL >10.00    Quantiferon TB Result      Negative  Negative

## 2024-10-23 ENCOUNTER — OFFICE VISIT (OUTPATIENT)
Dept: FAMILY MEDICINE CLINIC | Facility: CLINIC | Age: 44
End: 2024-10-23
Payer: COMMERCIAL

## 2024-10-23 VITALS
WEIGHT: 178 LBS | BODY MASS INDEX: 33.61 KG/M2 | OXYGEN SATURATION: 98 % | HEART RATE: 81 BPM | SYSTOLIC BLOOD PRESSURE: 120 MMHG | DIASTOLIC BLOOD PRESSURE: 74 MMHG | HEIGHT: 61 IN | RESPIRATION RATE: 18 BRPM | TEMPERATURE: 99 F

## 2024-10-23 DIAGNOSIS — J01.90 ACUTE RHINOSINUSITIS: Primary | ICD-10-CM

## 2024-10-23 DIAGNOSIS — H92.02 OTALGIA, LEFT EAR: ICD-10-CM

## 2024-10-23 PROCEDURE — 99213 OFFICE O/P EST LOW 20 MIN: CPT | Performed by: PHYSICIAN ASSISTANT

## 2024-10-23 RX ORDER — DOXYCYCLINE 100 MG/1
100 CAPSULE ORAL 2 TIMES DAILY
Qty: 14 CAPSULE | Refills: 0 | Status: SHIPPED | OUTPATIENT
Start: 2024-10-23 | End: 2024-10-30

## 2024-10-23 NOTE — PROGRESS NOTES
CHIEF COMPLAINT:     Chief Complaint   Patient presents with    Ear Problem     Left ear pain, patient stated she has been sick for 2 weeks      HPI:   Lupe Beatty is a 44 year old female who presents for sinus congestion for 2 weeks. Symptoms have been persisting since onset. Sinus congestion/pain is described as a pressure and is located mainly at forehead, ethmoid sinuses and around eyes.  Patient reports no chills, +sinus headache, +post nasal drainage, +cough due to post nasal drainage, no sore throat, + fullness, pain and crackling in L ear and below ear.  Denies fever, dental pain, tinnitus.    Has treated symptoms with Mucinex D.     Had sinus surgery    Current Outpatient Medications   Medication Sig Dispense Refill    doxycycline 100 MG Oral Cap Take 1 capsule (100 mg total) by mouth 2 (two) times daily for 7 days. 14 capsule 0    Tofacitinib Citrate ER (XELJANZ XR) 11 MG Oral Tablet 24 Hr Take 11 mg by mouth daily. 30 tablet 5    QULIPTA 60 MG Oral Tab Take 60 mg by mouth daily.      UBRELVY 100 MG Oral Tab Take 100 mg by mouth.      TRINTELLIX 20 MG Oral Tab Take 1 tablet (20 mg total) by mouth daily.      methylPREDNISolone 4 MG Oral Tablet Therapy Pack Take as directed on package. 1 each 0    diclofenac 75 MG Oral Tab EC Take 1 tablet (75 mg total) by mouth 2 (two) times daily. 180 tablet 1    methylPREDNISolone 4 MG Oral Tablet Therapy Pack Take as directed on package. 1 each 0    FETZIMA 40 MG Oral Capsule SR 24 Hr       lamoTRIgine 150 MG Oral Tab Take 2 tablets (300 mg total) by mouth daily.      SUMAtriptan 50 MG Oral Tab Take 1 tablet (50 mg total) by mouth every 2 (two) hours as needed for Migraine. DO NOT TAKE MORE TAKE 2 PILLS A DAY 10 tablet 3    levothyroxine 50 MCG Oral Tab Take 1 tablet (50 mcg total) by mouth before breakfast. 90 tablet 3    dexlansoprazole (DEXILANT) 30 MG Oral Capsule Delayed Release Take 30 mg by mouth daily. 30 capsule 5    albuterol (2.5 MG/3ML) 0.083% Inhalation  Nebu Soln Take 3 mL (2.5 mg total) by nebulization every 4 (four) hours as needed for Wheezing. 60 each 3    ergocalciferol 1.25 MG (69274 UT) Oral Cap Take 1 capsule (50,000 Units total) by mouth once a week. 12 capsule 3    fluticasone propionate 50 MCG/ACT Nasal Suspension 2 sprays by Each Nare route nightly. Point up and out. (Patient not taking: Reported on 4/11/2024) 1 each 0    albuterol 108 (90 Base) MCG/ACT Inhalation Aero Soln Inhale 2 puffs into the lungs every 4 (four) hours as needed for Wheezing or Shortness of Breath. 1 each 0    ClonazePAM (KLONOPIN) 0.5 MG Oral Tab 0.5 tablets (0.25 mg total) 3 (three) times daily as needed (sleep, anxiety).  2      Past Medical History:    Acute pelvic pain, female    Laparoscopy -diagnostic (2008)    Allergic rhinitis    Ankylosing spondylitis (HCC)    Anxiety    Anxiety state, unspecified    Asthma (HCC)    COVID    Depression    Extrinsic asthma, unspecified    GERD (gastroesophageal reflux disease)    History of nephrolithiasis    Hx of sinus surgery    Hyperlipidemia    Hypothyroidism    IBS (irritable bowel syndrome)    Lipid screening    per NG      Past Surgical History:   Procedure Laterality Date    Hernia surgery  2007    Knee arthroscopy  2007    Rotator cuff repair Left 08/12/2022    Sinus surgery        Upper gi endoscopy,biopsy  2018      Family History   Problem Relation Age of Onset    Lipids Father         Hyperlipidemia    Hypertension Father     Heart Disease Father         CAD    Lipids Mother         Hyperlipidemia    Hypertension Mother     Depression Mother     Other (Other) Mother         COPD    Lipids Maternal Grandmother         Hyperlipidemia    Lipids Maternal Grandfather         Hyperlipidemia    Lipids Paternal Grandmother         Hyperlipidemia    Diabetes Paternal Grandfather     Lipids Paternal Grandfather         Hyperlipidemia      Social History     Socioeconomic History    Marital status:    Tobacco Use    Smoking  status: Never    Smokeless tobacco: Never   Vaping Use    Vaping status: Never Used   Substance and Sexual Activity    Alcohol use: No     Alcohol/week: 0.0 standard drinks of alcohol    Drug use: No   Other Topics Concern    Caffeine Concern Yes     Comment: Coffee, 1 cup daily; 1-2 cans of soda    Exercise Yes    Pt has a pacemaker No    Pt has a defibrillator No    Reaction to local anesthetic No     Social Drivers of Health      [facility]ctions    [facility]    Housing Stability         REVIEW OF SYSTEMS:   GENERAL: feels well otherwise,  ok appetite, +fatigue  HEENT: See HPI.    LUNGS: denies shortness of breath or wheezing, See HPI  CARDIOVASCULAR: denies chest pain or palpitations   NEURO: + sinus headaches.  No numbness or tingling in face.    EXAM:   /74 (BP Location: Right arm, Patient Position: Sitting, Cuff Size: large)   Pulse 81   Temp 98.6 °F (37 °C)   Resp 18   Ht 5' 1\" (1.549 m)   Wt 178 lb (80.7 kg)   LMP 10/18/2024 (Approximate)   SpO2 98%   BMI 33.63 kg/m²   GENERAL: well developed, well nourished, in no apparent distress  HEAD: atraumatic, normocephalic,  + tenderness on palpation of ethmoid sinuses  EYES: conjunctiva clear; EOMI.  EARS: TM's clear gray bilaterally, no bulging, no retraction, no fluid. Bony landmarks sharp bilaterally  NOSE: nostrils patent, no nasal mucous, nasal mucosa reddened and swollen  THROAT: oral mucosa pink, moist.  Posterior pharynx is not erythematous. No exudates.  LUNGS: Breathing is non labored; clear to auscultation bilaterally. No wheezing, rales or rhonchi, no decreased BS  CARDIO: RRR without murmur  LYMPH:  no cervical lymphadenopathy.   NEURO: No focal deficits     ASSESSMENT AND PLAN:   ASSESSMENT:  Lupe Beatty is a 44 year old female who presents with    ASSESSMENT:   Encounter Diagnoses   Name  Primary?    Acute rhinosinusitis Yes    Otalgia, left ear        PLAN: Reviewed meds and instructions as below with patient.   Risks, benefits, and side effects of medication explained and discussed.   Comfort measures discussed.   To follow-up with PCP if no improvement in 3-5 days or sooner for new or worsening sx.   Verbalizes understanding of these issues and agrees to the plan.    Meds & Refills for this Visit:  Requested Prescriptions     Signed Prescriptions Disp Refills    doxycycline 100 MG Oral Cap 14 capsule 0     Sig: Take 1 capsule (100 mg total) by mouth 2 (two) times daily for 7 days.           There are no Patient Instructions on file for this visit.

## 2024-12-27 ENCOUNTER — HOSPITAL ENCOUNTER (EMERGENCY)
Facility: HOSPITAL | Age: 44
Discharge: HOME | End: 2024-12-27
Attending: EMERGENCY MEDICINE
Payer: COMMERCIAL

## 2024-12-27 ENCOUNTER — APPOINTMENT (OUTPATIENT)
Dept: GENERAL RADIOLOGY | Facility: HOSPITAL | Age: 44
End: 2024-12-27
Payer: COMMERCIAL

## 2024-12-27 VITALS
BODY MASS INDEX: 32.85 KG/M2 | HEIGHT: 61 IN | OXYGEN SATURATION: 98 % | RESPIRATION RATE: 18 BRPM | TEMPERATURE: 98 F | SYSTOLIC BLOOD PRESSURE: 122 MMHG | DIASTOLIC BLOOD PRESSURE: 70 MMHG | HEART RATE: 64 BPM | WEIGHT: 174 LBS

## 2024-12-27 DIAGNOSIS — R00.2 PALPITATIONS: Primary | ICD-10-CM

## 2024-12-27 DIAGNOSIS — R19.7 DIARRHEA OF PRESUMED INFECTIOUS ORIGIN: ICD-10-CM

## 2024-12-27 LAB
ALBUMIN SERPL-MCNC: 4.8 G/DL (ref 3.2–4.8)
ALBUMIN/GLOB SERPL: 1.9 {RATIO} (ref 1–2)
ALP LIVER SERPL-CCNC: 85 U/L
ALT SERPL-CCNC: 19 U/L
ANION GAP SERPL CALC-SCNC: 8 MMOL/L (ref 0–18)
AST SERPL-CCNC: 17 U/L (ref ?–34)
B-HCG UR QL: NEGATIVE
BASOPHILS # BLD AUTO: 0.03 X10(3) UL (ref 0–0.2)
BASOPHILS NFR BLD AUTO: 0.3 %
BILIRUB SERPL-MCNC: 0.3 MG/DL (ref 0.3–1.2)
BUN BLD-MCNC: 12 MG/DL (ref 9–23)
BUN/CREAT SERPL: 14.6 (ref 10–20)
CALCIUM BLD-MCNC: 9.9 MG/DL (ref 8.7–10.4)
CHLORIDE SERPL-SCNC: 107 MMOL/L (ref 98–112)
CO2 SERPL-SCNC: 25 MMOL/L (ref 21–32)
CREAT BLD-MCNC: 0.82 MG/DL
DEPRECATED RDW RBC AUTO: 38.9 FL (ref 35.1–46.3)
EGFRCR SERPLBLD CKD-EPI 2021: 92 ML/MIN/1.73M2 (ref 60–?)
EOSINOPHIL # BLD AUTO: 0.09 X10(3) UL (ref 0–0.7)
EOSINOPHIL NFR BLD AUTO: 1 %
ERYTHROCYTE [DISTWIDTH] IN BLOOD BY AUTOMATED COUNT: 12.4 % (ref 11–15)
GLOBULIN PLAS-MCNC: 2.5 G/DL (ref 2–3.5)
GLUCOSE BLD-MCNC: 96 MG/DL (ref 70–99)
HCT VFR BLD AUTO: 36.6 %
HGB BLD-MCNC: 13 G/DL
IMM GRANULOCYTES # BLD AUTO: 0.05 X10(3) UL (ref 0–1)
IMM GRANULOCYTES NFR BLD: 0.6 %
LYMPHOCYTES # BLD AUTO: 3.06 X10(3) UL (ref 1–4)
LYMPHOCYTES NFR BLD AUTO: 35.5 %
MCH RBC QN AUTO: 30.9 PG (ref 26–34)
MCHC RBC AUTO-ENTMCNC: 35.5 G/DL (ref 31–37)
MCV RBC AUTO: 86.9 FL
MONOCYTES # BLD AUTO: 0.47 X10(3) UL (ref 0.1–1)
MONOCYTES NFR BLD AUTO: 5.5 %
NEUTROPHILS # BLD AUTO: 4.91 X10 (3) UL (ref 1.5–7.7)
NEUTROPHILS # BLD AUTO: 4.91 X10(3) UL (ref 1.5–7.7)
NEUTROPHILS NFR BLD AUTO: 57.1 %
OSMOLALITY SERPL CALC.SUM OF ELEC: 290 MOSM/KG (ref 275–295)
PLATELET # BLD AUTO: 328 10(3)UL (ref 150–450)
POTASSIUM SERPL-SCNC: 3.8 MMOL/L (ref 3.5–5.1)
PROT SERPL-MCNC: 7.3 G/DL (ref 5.7–8.2)
RBC # BLD AUTO: 4.21 X10(6)UL
SODIUM SERPL-SCNC: 140 MMOL/L (ref 136–145)
TROPONIN I SERPL HS-MCNC: <3 NG/L
WBC # BLD AUTO: 8.6 X10(3) UL (ref 4–11)

## 2024-12-27 PROCEDURE — 84484 ASSAY OF TROPONIN QUANT: CPT | Performed by: EMERGENCY MEDICINE

## 2024-12-27 PROCEDURE — 96360 HYDRATION IV INFUSION INIT: CPT

## 2024-12-27 PROCEDURE — 85025 COMPLETE CBC W/AUTO DIFF WBC: CPT | Performed by: EMERGENCY MEDICINE

## 2024-12-27 PROCEDURE — 99285 EMERGENCY DEPT VISIT HI MDM: CPT

## 2024-12-27 PROCEDURE — 93005 ELECTROCARDIOGRAM TRACING: CPT

## 2024-12-27 PROCEDURE — 71045 X-RAY EXAM CHEST 1 VIEW: CPT | Performed by: EMERGENCY MEDICINE

## 2024-12-27 PROCEDURE — 99284 EMERGENCY DEPT VISIT MOD MDM: CPT

## 2024-12-27 PROCEDURE — 81025 URINE PREGNANCY TEST: CPT

## 2024-12-27 PROCEDURE — 93010 ELECTROCARDIOGRAM REPORT: CPT

## 2024-12-27 PROCEDURE — 80053 COMPREHEN METABOLIC PANEL: CPT | Performed by: EMERGENCY MEDICINE

## 2024-12-27 RX ORDER — DIPHENOXYLATE HYDROCHLORIDE AND ATROPINE SULFATE 2.5; .025 MG/1; MG/1
1 TABLET ORAL 4 TIMES DAILY PRN
Qty: 20 TABLET | Refills: 0 | Status: SHIPPED | OUTPATIENT
Start: 2024-12-27 | End: 2025-01-16

## 2024-12-27 NOTE — ED INITIAL ASSESSMENT (HPI)
Patient reports heart palpitations and shortness of breath for 2-3 days. Symptoms are intermittent. Also feels lightheaded with activity. +diarrhea x2 weeks

## 2024-12-28 NOTE — ED PROVIDER NOTES
Patient Seen in: [facility] Emergency Department    History     Chief Complaint   Patient presents with    Arrythmia/Palpitations    Diarrhea    Lightheadedness       HPI    Patient presents to the ED complaining of palpitations and shortness of breath starting 2 to 3 days ago.  Symptoms come and go.  Associated diarrhea for the past 2 weeks.  Patient denies other complaints.  No fevers chills, chest pain or other complaints.  She states increased rest at work.    History reviewed.   Past Medical History:    Acute pelvic pain, female    Laparoscopy -diagnostic (2008)    Allergic rhinitis    Ankylosing spondylitis (HCC)    Anxiety    Anxiety state, unspecified    Asthma (HCC)    COVID    Depression    Extrinsic asthma, unspecified    GERD (gastroesophageal reflux disease)    History of nephrolithiasis    Hx of sinus surgery    Hyperlipidemia    Hypothyroidism    IBS (irritable bowel syndrome)    Lipid screening    per        History reviewed.   Past Surgical History:   Procedure Laterality Date    Hernia surgery  2007    Knee arthroscopy  2007    Rotator cuff repair Left 08/12/2022    Sinus surgery        Upper gi endoscopy,biopsy  2018         Medications :  Prescriptions Prior to Admission[1]     Family History   Problem Relation Age of Onset    Lipids Father         Hyperlipidemia    Hypertension Father     Heart Disease Father         CAD    Lipids Mother         Hyperlipidemia    Hypertension Mother     Depression Mother     Other (Other) Mother         COPD    Lipids Maternal Grandmother         Hyperlipidemia    Lipids Maternal Grandfather         Hyperlipidemia    Lipids Paternal Grandmother         Hyperlipidemia    Diabetes Paternal Grandfather     Lipids Paternal Grandfather         Hyperlipidemia       Smoking Status:   Social History     Socioeconomic History    Marital status:    Tobacco Use    Smoking status: Never    Smokeless tobacco: Never   Vaping Use    Vaping status: Never Used    Substance and Sexual Activity    Alcohol use: No     Alcohol/week: 0.0 standard drinks of alcohol    Drug use: No   Other Topics Concern    Caffeine Concern Yes     Comment: Coffee, 1 cup daily; 1-2 cans of soda    Exercise Yes    Pt has a pacemaker No    Pt has a defibrillator No    Reaction to local anesthetic No       Constitutional and vital signs reviewed.      Social History and Family History elements reviewed from today, pertinent positives to the presenting problem noted.    Physical Exam     ED Triage Vitals [12/27/24 1722]   /87   Pulse 87   Resp 18   Temp 98.1 °F (36.7 °C)   Temp src Temporal   SpO2 98 %   O2 Device None (Room air)       All measures to prevent infection transmission during my interaction with the patient were taken. Handwashing was performed prior to and after the exam.  Stethoscope and any equipment used during my examination was cleaned with super sani-cloth germicidal wipes following the exam.     Physical Exam  Vitals and nursing note reviewed.   Constitutional:       General: She is not in acute distress.     Appearance: Normal appearance. She is well-developed. She is not ill-appearing or toxic-appearing.   HENT:      Head: Normocephalic and atraumatic.   Eyes:      General:         Right eye: No discharge.         Left eye: No discharge.      Conjunctiva/sclera: Conjunctivae normal.   Neck:      Trachea: No tracheal deviation.   Cardiovascular:      Rate and Rhythm: Normal rate and regular rhythm.      Heart sounds: Normal heart sounds.   Pulmonary:      Effort: Pulmonary effort is normal. No respiratory distress.      Breath sounds: Normal breath sounds. No stridor.   Abdominal:      General: There is no distension.      Palpations: Abdomen is soft.   Musculoskeletal:         General: No deformity.   Skin:     General: Skin is warm and dry.   Neurological:      Mental Status: She is alert and oriented to person, place, and time.   Psychiatric:         Behavior: Behavior  normal.      Comments: Anxious mood         ED Course        Labs Reviewed   COMP METABOLIC PANEL (14) - Normal   TROPONIN I HIGH SENSITIVITY - Normal   POCT PREGNANCY URINE - Normal   CBC WITH DIFFERENTIAL WITH PLATELET   RAINBOW DRAW LAVENDER   RAINBOW DRAW LIGHT GREEN     EKG    Rate, intervals and axes as noted on EKG Report.  Rate: Normal, 72 bpm  Rhythm: Sinus Rhythm  Reading: Normal intervals, normal ST segments, normal EKG           As Interpreted by me    Imaging Results Available and Reviewed while in ED: XR CHEST AP PORTABLE  (CPT=71045)    Result Date: 12/27/2024  CONCLUSION: No radiographic evidence of acute cardiopulmonary abnormality.    elm-remote    Dictated by (CST): Rogers Moser MD on 12/27/2024 at 9:20 PM     Finalized by (CST): Rogers Moser MD on 12/27/2024 at 9:21 PM         ED Medications Administered:   Medications   sodium chloride 0.9 % IV bolus 1,000 mL (0 mL Intravenous Stopped 12/27/24 2154)         MDM     Vitals:    12/27/24 1722 12/27/24 2015 12/27/24 2051   BP: 134/87 120/71 122/70   Pulse: 87 66 64   Resp: 18 13 18   Temp: 98.1 °F (36.7 °C)     TempSrc: Temporal     SpO2: 98% 98%    Weight: 78.9 kg     Height: 154.9 cm (5' 1\")       *I personally reviewed and interpreted all ED vitals.    Pulse Ox: 98%, Room air, Normal     Monitor Interpretation:   normal sinus rhythm as interpreted by me.  The cardiac monitor was ordered to monitor heart rate.    Differential Diagnosis/ Diagnostic Considerations: Anxiety, arrhythmia, dehydration, VIOLETTE, palpitations, other    Complicating Factors: The patient already has does not have any pertinent problems on file. to contribute to the complexity of this ED evaluation.    Medical Decision Making  The patient presents to the ED with intermittent palpitations for the past several days and diarrhea for the past 2 weeks.  Nondistressed on examination.  Cardiac workup without abnormality suspect possible anxiety component.  Laboratory testing without  concerning findings.  Patient requesting diarrhea medication, recommended outpatient follow-up and return precautions.    Problems Addressed:  Diarrhea of presumed infectious origin: acute illness or injury  Palpitations: acute illness or injury that poses a threat to life or bodily functions    Amount and/or Complexity of Data Reviewed  Labs: ordered. Decision-making details documented in ED Course.  Radiology: ordered and independent interpretation performed. Decision-making details documented in ED Course.     Details: I personally reviewed the patient's chest x-ray image and noted no pneumothorax  ECG/medicine tests: ordered and independent interpretation performed. Decision-making details documented in ED Course.    Risk  Prescription drug management.        Condition upon leaving the department: Stable    Disposition and Plan     Clinical Impression:  1. Palpitations    2. Diarrhea of presumed infectious origin        Disposition:  Discharge    Follow-up:  Stefany Al MD  [address] [place] [address] [id number] [phone]    Schedule an appointment as soon as possible for a visit in 3 day(s)        Medications Prescribed:  Discharge Medication List as of 12/27/2024  9:55 PM        START taking these medications    Details   diphenoxylate-atropine 2.5-0.025 MG Oral Tab Take 1 tablet by mouth 4 (four) times daily as needed for Diarrhea., Normal, Disp-20 tablet, R-0                              [1] (Not in a hospital admission)

## 2024-12-29 LAB
ATRIAL RATE: 72 BPM
P AXIS: 41 DEGREES
P-R INTERVAL: 186 MS
Q-T INTERVAL: 388 MS
QRS DURATION: 70 MS
QTC CALCULATION (BEZET): 424 MS
R AXIS: 49 DEGREES
T AXIS: 63 DEGREES
VENTRICULAR RATE: 72 BPM

## 2025-01-20 RX ORDER — DEXLANSOPRAZOLE 30 MG/1
30 CAPSULE, DELAYED RELEASE ORAL DAILY
Qty: 30 CAPSULE | Refills: 5 | OUTPATIENT
Start: 2025-01-20

## 2025-02-14 ENCOUNTER — MED REC SCAN ONLY (OUTPATIENT)
Dept: INTERNAL MEDICINE CLINIC | Facility: CLINIC | Age: 45
End: 2025-02-14

## 2025-03-03 RX ORDER — LEVOTHYROXINE SODIUM 50 UG/1
50 TABLET ORAL
Qty: 30 TABLET | Refills: 0 | Status: SHIPPED | OUTPATIENT
Start: 2025-03-03

## 2025-04-05 NOTE — TELEPHONE ENCOUNTER
LOV: 6/30/23  Last Refilled:#1 kit, 5kvng 6/30/23    Future Appointments   Date Time Provider Alexander Tadeo   1/16/2024 10:40 AM Jose Plaza MD 2014 [facility]     Summary:  1. Cont. Cimzia 400mg a month  2. Cont. diclofenac 75mg twice a day  3. Plan for neck injections -   4. .  Medrol radha  take with klonipin prn   3. meditterean diet and plant based diet   4. omega3 supplement. 5. Tumeric supplement  6. Check labs   7. Return to clinic in 3-6  months. In the past . PT for lower back - left side - also consider  marybeth chi or yoga or heated pool stretching   And she has to  Fax TB report to  - [phone] Jenifer Ortiz MD  6/30/2023   11:34 AM  Please advise. 26

## 2025-04-08 RX ORDER — LEVOTHYROXINE SODIUM 50 UG/1
50 TABLET ORAL
Qty: 30 TABLET | Refills: 0 | OUTPATIENT
Start: 2025-04-08

## 2025-04-15 RX ORDER — DICLOFENAC SODIUM 75 MG/1
75 TABLET, DELAYED RELEASE ORAL 2 TIMES DAILY
Qty: 180 TABLET | Refills: 1 | Status: SHIPPED | OUTPATIENT
Start: 2025-04-15

## 2025-04-15 NOTE — TELEPHONE ENCOUNTER
Please advise. Patient inform she is overdue for an appointment. PA for Xeljanz set to  in .    Requested Prescriptions     Pending Prescriptions Disp Refills    DICLOFENAC 75 MG Oral Tab EC [Pharmacy Med Name: DICLOFENAC SOD EC 75 MG TAB] 180 tablet 1     Sig: TAKE 1 TABLET BY MOUTH TWICE A DAY     LOV: 24  Future Appointments   Date Time Provider Department Center   2025 12:30 PM [facility] [address] [facility]     Labs:   Component      Latest Ref Rng 2024   WBC      4.0 - 11.0 x10(3) uL 8.6    RBC      3.80 - 5.30 x10(6)uL 4.21    Hemoglobin      12.0 - 16.0 g/dL 13.0    Hematocrit      35.0 - 48.0 % 36.6    MCV      80.0 - 100.0 fL 86.9    MCH      26.0 - 34.0 pg 30.9    MCHC      31.0 - 37.0 g/dL 35.5    RDW-SD      35.1 - 46.3 fL 38.9    RDW      11.0 - 15.0 % 12.4    Platelet Count      150.0 - 450.0 10(3)uL 328.0    Prelim Neutrophil Abs      1.50 - 7.70 x10 (3) uL 4.91    Neutrophils Absolute      1.50 - 7.70 x10(3) uL 4.91    Lymphocytes Absolute      1.00 - 4.00 x10(3) uL 3.06    Monocytes Absolute      0.10 - 1.00 x10(3) uL 0.47    Eosinophils Absolute      0.00 - 0.70 x10(3) uL 0.09    Basophils Absolute      0.00 - 0.20 x10(3) uL 0.03    Immature Granulocyte Absolute      0.00 - 1.00 x10(3) uL 0.05    Neutrophils %      % 57.1    Lymphocytes %      % 35.5    Monocytes %      % 5.5    Eosinophils %      % 1.0    Basophils %      % 0.3    Immature Granulocyte %      % 0.6    Glucose      70 - 99 mg/dL 96    Sodium      136 - 145 mmol/L 140    Potassium      3.5 - 5.1 mmol/L 3.8    Chloride      98 - 112 mmol/L 107    Carbon Dioxide, Total      21.0 - 32.0 mmol/L 25.0    ANION GAP      0 - 18 mmol/L 8    BUN      9 - 23 mg/dL 12    CREATININE      0.55 - 1.02 mg/dL 0.82    BUN/CREATININE RATIO      10.0 - 20.0  14.6    CALCIUM      8.7 - 10.4 mg/dL 9.9    CALCULATED OSMOLALITY      275 - 295 mOsm/kg 290    EGFR      >=60 mL/min/1.73m2 92    ALT (SGPT)      10 - 49  U/L 19    AST (SGOT)      <34 U/L 17    ALKALINE PHOSPHATASE      37 - 98 U/L 85    Total Bilirubin      0.3 - 1.2 mg/dL 0.3    PROTEIN, TOTAL      5.7 - 8.2 g/dL 7.3    Albumin      3.2 - 4.8 g/dL 4.8    Globulin      2.0 - 3.5 g/dL 2.5    A/G Ratio      1.0 - 2.0  1.9          Summary:  Will try taltz 160mg once and then 80mg every month   2. Cont. diclofenac 75mg twice a day  3. Medrol radha  - for pain take with klonipin prn   4. omega3 supplement.   5. Tumeric supplement  6. Check TB tests today -   7. Return to clinic in 3 -4 months -      Can try for either simponi or xeljanz            In the past . PT for lower back - left side - also consider  marybeth chi or yoga or heated pool stretching   And she has to  Fax TB report to  - [phone] -      Rj Doe MD  8/24/2024   10:18 AM

## 2025-04-21 ENCOUNTER — OFFICE VISIT (OUTPATIENT)
Dept: INTERNAL MEDICINE CLINIC | Facility: CLINIC | Age: 45
End: 2025-04-21
Payer: COMMERCIAL

## 2025-04-21 VITALS
DIASTOLIC BLOOD PRESSURE: 70 MMHG | BODY MASS INDEX: 32.1 KG/M2 | SYSTOLIC BLOOD PRESSURE: 110 MMHG | HEIGHT: 61 IN | WEIGHT: 170 LBS

## 2025-04-21 DIAGNOSIS — E55.9 VITAMIN D DEFICIENCY: ICD-10-CM

## 2025-04-21 DIAGNOSIS — Z00.00 WELLNESS EXAMINATION: Primary | ICD-10-CM

## 2025-04-21 PROCEDURE — 99396 PREV VISIT EST AGE 40-64: CPT | Performed by: INTERNAL MEDICINE

## 2025-04-21 RX ORDER — LEVOTHYROXINE SODIUM 50 UG/1
50 TABLET ORAL
Qty: 30 TABLET | Refills: 0 | Status: SHIPPED | OUTPATIENT
Start: 2025-04-21 | End: 2025-04-21

## 2025-04-21 RX ORDER — LEVOTHYROXINE SODIUM 50 UG/1
50 TABLET ORAL
Qty: 90 TABLET | Refills: 3 | Status: SHIPPED | OUTPATIENT
Start: 2025-04-21

## 2025-04-21 NOTE — PROGRESS NOTES
Subjective:   Lupe Beatty is a 44 year old female who presents for Physical   Patient here for a wellness examination and fu on arthralgias  thought to be ankylosing spondyltis - but is doubting the dx.  Seeing a chiropractor as needed for symptom control.  Had some arrythmias - did a full cardiac work up. No rx - thought to be stress related    History/Other:    Chief Complaint Reviewed and Verified  No Further Nursing Notes to   Review  Medications Reviewed  Problem List Reviewed         Tobacco: non smoker       Current Medications[1]      Review of Systems:  Review of Systems   Constitutional:  Positive for fatigue.   Musculoskeletal:  Positive for arthralgias and myalgias.   Psychiatric/Behavioral:  Positive for dysphoric mood.          Objective:   /70   Ht 5' 1\" (1.549 m)   Wt 170 lb (77.1 kg)   LMP 12/13/2024 (Approximate)   BMI 32.12 kg/m²  Estimated body mass index is 32.12 kg/m² as calculated from the following:    Height as of this encounter: 5' 1\" (1.549 m).    Weight as of this encounter: 170 lb (77.1 kg).  Physical Exam  Constitutional:       Appearance: Normal appearance.   HENT:      Head: Normocephalic and atraumatic.      Right Ear: Ear canal normal.      Left Ear: Ear canal normal.   Eyes:      Pupils: Pupils are equal, round, and reactive to light.   Cardiovascular:      Rate and Rhythm: Normal rate and regular rhythm.      Heart sounds: No murmur heard.     No gallop.   Pulmonary:      Effort: Pulmonary effort is normal.      Breath sounds: Normal breath sounds.   Abdominal:      Palpations: Abdomen is soft.   Genitourinary:     Comments: Breasts dense no masses  Musculoskeletal:         General: Tenderness (multiple joints) present.      Cervical back: Neck supple.      Right lower leg: No edema.      Left lower leg: No edema.   Skin:     Findings: No lesion.   Neurological:      Mental Status: She is alert. Mental status is at baseline.   Psychiatric:         Thought Content:  Thought content normal.           Assessment & Plan:   1. Wellness examination (Primary) check fasting labs   Vit d deficiency  2. Fatigue - check tsh      No follow-ups on file.    Stefany Al MD, 4/21/2025, 12:33 PM        [1]   Current Outpatient Medications   Medication Sig Dispense Refill    DICLOFENAC 75 MG Oral Tab EC TAKE 1 TABLET BY MOUTH TWICE A  tablet 1    levothyroxine 50 MCG Oral Tab TAKE 1 TABLET BY MOUTH BEFORE BREAKFAST. 30 tablet 0    methylPREDNISolone 4 MG Oral Tablet Therapy Pack Take as directed on package. 1 each 0    Tofacitinib Citrate ER (XELJANZ XR) 11 MG Oral Tablet 24 Hr Take 11 mg by mouth daily. 30 tablet 5    QULIPTA 60 MG Oral Tab Take 60 mg by mouth daily.      UBRELVY 100 MG Oral Tab Take 100 mg by mouth.      TRINTELLIX 20 MG Oral Tab Take 1 tablet (20 mg total) by mouth daily.      methylPREDNISolone 4 MG Oral Tablet Therapy Pack Take as directed on package. 1 each 0    methylPREDNISolone 4 MG Oral Tablet Therapy Pack Take as directed on package. 1 each 0    FETZIMA 40 MG Oral Capsule SR 24 Hr       lamoTRIgine 150 MG Oral Tab Take 2 tablets (300 mg total) by mouth daily.      SUMAtriptan 50 MG Oral Tab Take 1 tablet (50 mg total) by mouth every 2 (two) hours as needed for Migraine. DO NOT TAKE MORE TAKE 2 PILLS A DAY 10 tablet 3    dexlansoprazole (DEXILANT) 30 MG Oral Capsule Delayed Release Take 30 mg by mouth daily. 30 capsule 5    albuterol (2.5 MG/3ML) 0.083% Inhalation Nebu Soln Take 3 mL (2.5 mg total) by nebulization every 4 (four) hours as needed for Wheezing. 60 each 3    ergocalciferol 1.25 MG (20620 UT) Oral Cap Take 1 capsule (50,000 Units total) by mouth once a week. 12 capsule 3    fluticasone propionate 50 MCG/ACT Nasal Suspension 2 sprays by Each Nare route nightly. Point up and out. (Patient not taking: Reported on 4/11/2024) 1 each 0    albuterol 108 (90 Base) MCG/ACT Inhalation Aero Soln Inhale 2 puffs into the lungs every 4 (four) hours as needed  for Wheezing or Shortness of Breath. 1 each 0    ClonazePAM (KLONOPIN) 0.5 MG Oral Tab 0.5 tablets (0.25 mg total) 3 (three) times daily as needed (sleep, anxiety).  2

## 2025-07-21 RX ORDER — DICLOFENAC SODIUM 75 MG/1
75 TABLET, DELAYED RELEASE ORAL 2 TIMES DAILY
Qty: 180 TABLET | Refills: 0 | Status: SHIPPED | OUTPATIENT
Start: 2025-07-21

## 2025-07-21 NOTE — TELEPHONE ENCOUNTER
Current Outpatient Medications   Medication Sig Dispense Refill    DICLOFENAC 75 MG Oral Tab EC TAKE 1 TABLET BY MOUTH TWICE A  tablet 1

## 2025-07-21 NOTE — TELEPHONE ENCOUNTER
LOV:  8/24/24- My Chart message sent to schedule an appointment.   No future appointments.  Labs:      Component      Latest Ref Rng 12/27/2024   Glucose      70 - 99 mg/dL 96    Sodium      136 - 145 mmol/L 140    Potassium      3.5 - 5.1 mmol/L 3.8    Chloride      98 - 112 mmol/L 107    Carbon Dioxide, Total      21.0 - 32.0 mmol/L 25.0    ANION GAP      0 - 18 mmol/L 8    BUN      9 - 23 mg/dL 12    CREATININE      0.55 - 1.02 mg/dL 0.82    BUN/CREATININE RATIO      10.0 - 20.0  14.6    CALCIUM      8.7 - 10.4 mg/dL 9.9    CALCULATED OSMOLALITY      275 - 295 mOsm/kg 290    EGFR      >=60 mL/min/1.73m2 92    ALT (SGPT)      10 - 49 U/L 19    AST (SGOT)      <34 U/L 17    ALKALINE PHOSPHATASE      37 - 98 U/L 85    Total Bilirubin      0.3 - 1.2 mg/dL 0.3    PROTEIN, TOTAL      5.7 - 8.2 g/dL 7.3    Albumin      3.2 - 4.8 g/dL 4.8    Globulin      2.0 - 3.5 g/dL 2.5    A/G Ratio      1.0 - 2.0  1.9

## (undated) NOTE — LETTER
01/07/22    [facility]      Dear Lala Gunter records indicate that you have outstanding lab work and or testing that was ordered for you and has not yet been completed: Per Dr. Teodora Chambers last visit notes ne

## (undated) NOTE — LETTER
3/5/2019              [address]         Dear Alice Lyn,      After your last pap, Dr. Mary Wiseman MD recommended that you have a repeat pap  done in 1 year.   The repeat pap is due in March 20

## (undated) NOTE — MR AVS SNAPSHOT
[place]  [facility]  [address] [facility] [id number] [phone]               Thank you for choosing us for your health care visit with Kinsey Mera MD.  We are glad to serve you and happy to provide you with this TK 1 T PO QD 0.5 HOUR B DRE   Commonly known as:  PROTONIX           SUMAtriptan Succinate 50 MG Tabs   Take 1 tablet (50 mg total) by mouth every 2 (two) hours as needed for Migraine (Not to exceed 2 pills daily).    Commonly known as:  Thomes Arm

## (undated) NOTE — LETTER
7/5/2019              Eusebio Mendosa        [address] [address]         Dear Jon Kraus,    This message is to inform you we have tried to reach you by phone without success regarding a pending pap smear.  Please contact our office

## (undated) NOTE — LETTER
04/30/18        [address] [place] [id number]      Dear Nora Coats records indicate that you have outstanding lab work and or testing that was ordered for you and has not yet been completed:          Comp Metabolic Panel (14)

## (undated) NOTE — ED AVS SNAPSHOT
Stephanie Chandler   MRN: [id number]    Department:  [facility] Emergency Department   Date of Visit:  5/29/2018           Disclosure     Insurance plans vary and the physician(s) referred by the ER may not be covered by your plan.  Please contact you CARE PHYSICIAN AT ONCE OR RETURN IMMEDIATELY TO THE EMERGENCY DEPARTMENT. If you have been prescribed any medication(s), please fill your prescription right away and begin taking the medication(s) as directed.   If you believe that any of the medications

## (undated) NOTE — LETTER
8/13/2021              Cayden Jewell        [address] [address]         Dear Marciano Ortega,    This letter is to inform you that our office has made several attempts to reach you by phone without success.   We were attempting to contact

## (undated) NOTE — LETTER
AUTHORIZATION FOR SURGICAL OPERATION OR OTHER PROCEDURE    1.  I hereby authorize Dr. Jean Zapata, and [address] [place] [address] staff assigned to my case to perform the following operation and/or procedure at the [address] [place] [address]:  Colposcopy Biopsy  ________________ Time:  ________ A. M.  P.M.        Patient Name:  ______________________________________________________  (please print)      Patient signature:  ___________________________________________________             Relationship to Patient:

## (undated) NOTE — LETTER
02/26/18        [address] [place] [address]      Dear Osiris Edward records indicate that you have outstanding lab work and or testing that was ordered for you and has not yet been completed:          Comp Metabolic Panel (14)

## (undated) NOTE — ED AVS SNAPSHOT
Raudel Mccarthy   MRN: [id number]    Department:  [facility] Emergency Department   Date of Visit:  10/19/2017           Disclosure     Insurance plans vary and the physician(s) referred by the ER may not be covered by your plan.  Please contact yo CARE PHYSICIAN AT ONCE OR RETURN IMMEDIATELY TO THE EMERGENCY DEPARTMENT. If you have been prescribed any medication(s), please fill your prescription right away and begin taking the medication(s) as directed.   If you believe that any of the medications

## (undated) NOTE — LETTER
09/23/21    Bridgette Kebede [address] [place] [id number]      Dear Hayley Oleary records indicate that you have outstanding lab work and or testing that was ordered for you and has not yet been completed:  Orders Placed This Encounter      CBC,

## (undated) NOTE — MR AVS SNAPSHOT
[address] [facility] [address], [address] [facility] [phone]               Thank you for choosing us for your health care visit with Christin Ureña MD.  We are glad to serve you and happy to provide you with this ? Patient must present photo ID at time of . If a designated family member will be picking up prescription, office must be given name of individual in advance and they must present an ID as well. ?  The name of the person picking up your prescripti This list is accurate as of: 1/23/17 11:20 AM.  Always use your most recent med list.                Albuterol Sulfate  (90 BASE) MCG/ACT Aers   Inhale 2 puffs into the lungs every 6 (six) hours as needed for Wheezing.    Commonly known as:  PROAIR H Follow-up Instructions     Return in 3 months (on 4/23/2017). MyChart     Visit [employer]  You can access your MyChart to more actively manage your health care and view more details from this visit by going to [email or web address].   If you've Don’t forget strength training with weights and resistance Set goals and track your progress   You don’t need to join a gym. Home exercises work great.  Put more priority on exercise in your life                    Visit [facility] online at

## (undated) NOTE — LETTER
5/1/2020              Rossy Hooper        [address] [address]         Dear Bijan Briceño,    This letter is to inform you that our office has made several attempts to reach you by phone without success.   We were attempting to contact y